# Patient Record
Sex: FEMALE | Race: WHITE | NOT HISPANIC OR LATINO | Employment: FULL TIME | URBAN - METROPOLITAN AREA
[De-identification: names, ages, dates, MRNs, and addresses within clinical notes are randomized per-mention and may not be internally consistent; named-entity substitution may affect disease eponyms.]

---

## 2017-01-08 ENCOUNTER — ANESTHESIA EVENT (OUTPATIENT)
Dept: GASTROENTEROLOGY | Facility: HOSPITAL | Age: 32
End: 2017-01-08
Payer: COMMERCIAL

## 2017-01-09 ENCOUNTER — GENERIC CONVERSION - ENCOUNTER (OUTPATIENT)
Dept: OTHER | Facility: OTHER | Age: 32
End: 2017-01-09

## 2017-01-09 ENCOUNTER — HOSPITAL ENCOUNTER (OUTPATIENT)
Facility: HOSPITAL | Age: 32
Setting detail: OUTPATIENT SURGERY
Discharge: HOME | End: 2017-01-09
Attending: INTERNAL MEDICINE | Admitting: INTERNAL MEDICINE
Payer: COMMERCIAL

## 2017-01-09 ENCOUNTER — ANESTHESIA (OUTPATIENT)
Dept: GASTROENTEROLOGY | Facility: HOSPITAL | Age: 32
End: 2017-01-09
Payer: COMMERCIAL

## 2017-01-09 VITALS
BODY MASS INDEX: 22.19 KG/M2 | DIASTOLIC BLOOD PRESSURE: 59 MMHG | HEART RATE: 84 BPM | WEIGHT: 113 LBS | TEMPERATURE: 98 F | SYSTOLIC BLOOD PRESSURE: 102 MMHG | HEIGHT: 60 IN | RESPIRATION RATE: 18 BRPM | OXYGEN SATURATION: 100 %

## 2017-01-09 DIAGNOSIS — K30 FUNCTIONAL DYSPEPSIA: ICD-10-CM

## 2017-01-09 PROCEDURE — 88305 TISSUE EXAM BY PATHOLOGIST: CPT | Performed by: INTERNAL MEDICINE

## 2017-01-09 RX ORDER — NORGESTIMATE AND ETHINYL ESTRADIOL 0.25-0.035
1 KIT ORAL DAILY
COMMUNITY
End: 2018-04-17 | Stop reason: ALTCHOICE

## 2017-01-09 RX ORDER — SODIUM CHLORIDE 9 MG/ML
100 INJECTION, SOLUTION INTRAVENOUS CONTINUOUS
Status: DISCONTINUED | OUTPATIENT
Start: 2017-01-09 | End: 2017-01-09 | Stop reason: HOSPADM

## 2017-01-09 RX ORDER — PROPOFOL 10 MG/ML
INJECTION, EMULSION INTRAVENOUS AS NEEDED
Status: DISCONTINUED | OUTPATIENT
Start: 2017-01-09 | End: 2017-01-09 | Stop reason: SURG

## 2017-01-09 RX ADMIN — PROPOFOL 100 MG: 10 INJECTION, EMULSION INTRAVENOUS at 13:09

## 2017-01-09 RX ADMIN — PROPOFOL 20 MG: 10 INJECTION, EMULSION INTRAVENOUS at 13:13

## 2017-01-09 RX ADMIN — PROPOFOL 20 MG: 10 INJECTION, EMULSION INTRAVENOUS at 13:12

## 2017-01-09 RX ADMIN — SODIUM CHLORIDE 100 ML/HR: 0.9 INJECTION, SOLUTION INTRAVENOUS at 13:05

## 2017-01-09 RX ADMIN — PROPOFOL 20 MG: 10 INJECTION, EMULSION INTRAVENOUS at 13:11

## 2017-01-12 ENCOUNTER — GENERIC CONVERSION - ENCOUNTER (OUTPATIENT)
Dept: OTHER | Facility: OTHER | Age: 32
End: 2017-01-12

## 2017-06-05 ENCOUNTER — ALLSCRIPTS OFFICE VISIT (OUTPATIENT)
Dept: OTHER | Facility: OTHER | Age: 32
End: 2017-06-05

## 2017-08-17 ENCOUNTER — GENERIC CONVERSION - ENCOUNTER (OUTPATIENT)
Dept: OTHER | Facility: OTHER | Age: 32
End: 2017-08-17

## 2017-08-17 DIAGNOSIS — Z12.4 ENCOUNTER FOR SCREENING FOR MALIGNANT NEOPLASM OF CERVIX: ICD-10-CM

## 2017-08-17 DIAGNOSIS — Z00.00 ENCOUNTER FOR GENERAL ADULT MEDICAL EXAMINATION WITHOUT ABNORMAL FINDINGS: ICD-10-CM

## 2017-08-17 PROCEDURE — 88175 CYTOPATH C/V AUTO FLUID REDO: CPT | Performed by: FAMILY MEDICINE

## 2017-08-17 PROCEDURE — 87624 HPV HI-RISK TYP POOLED RSLT: CPT | Performed by: FAMILY MEDICINE

## 2017-08-18 ENCOUNTER — LAB REQUISITION (OUTPATIENT)
Dept: LAB | Facility: HOSPITAL | Age: 32
End: 2017-08-18
Payer: COMMERCIAL

## 2017-08-18 DIAGNOSIS — Z12.4 ENCOUNTER FOR SCREENING FOR MALIGNANT NEOPLASM OF CERVIX: ICD-10-CM

## 2017-08-18 DIAGNOSIS — Z00.00 ENCOUNTER FOR GENERAL ADULT MEDICAL EXAMINATION WITHOUT ABNORMAL FINDINGS: ICD-10-CM

## 2017-08-22 LAB — HPV RRNA GENITAL QL NAA+PROBE: NORMAL

## 2017-08-23 LAB
LAB AP GYN PRIMARY INTERPRETATION: NORMAL
Lab: NORMAL

## 2017-09-08 ENCOUNTER — ALLSCRIPTS OFFICE VISIT (OUTPATIENT)
Dept: OTHER | Facility: OTHER | Age: 32
End: 2017-09-08

## 2017-09-08 LAB — HCG, QUALITATIVE (HISTORICAL): NEGATIVE

## 2017-11-02 ENCOUNTER — OFFICE VISIT (OUTPATIENT)
Dept: URGENT CARE | Age: 32
End: 2017-11-02
Payer: COMMERCIAL

## 2017-11-02 PROCEDURE — 99213 OFFICE O/P EST LOW 20 MIN: CPT | Performed by: FAMILY MEDICINE

## 2017-11-04 NOTE — PROGRESS NOTES
Assessment  1  Acute bacterial conjunctivitis of right eye (372 03) (H10 31)    Plan  Acute bacterial conjunctivitis of right eye    · Tobramycin 0 3 % Ophthalmic Solution; INSTILL 2 DROP Every 4 hours IN  AFFECTED EYE   · Resume activity to your tolerance ; Status:Complete;   Done: 60DAH3429   · Seek Immediate Medical Attention if: The redness in the white area of the eye is not  better in 2 days ; Status:Complete;   Done: 01ITG4030   · Seek Immediate Medical Attention if: The redness of the eye is getting worse ;  Status:Complete;   Done: 00DQA4146    Discussion/Summary  Discussion Summary:   Avoid rubbing the eye  Medication Side Effects Reviewed: Possible side effects of new medications were reviewed with the patient/guardian today  Understands and agrees with treatment plan: The treatment plan was reviewed with the patient/guardian  The patient/guardian understands and agrees with the treatment plan   Counseling Documentation With Imm: The patient was counseled regarding instructions for management,-- prognosis,-- patient and family education,-- impressions,-- risks and benefits of treatment options,-- importance of compliance with treatment  Follow Up Instructions: Follow Up with your Primary Care Provider in 3-4 days  If your symptoms worsen, go to the nearest [place] [address] Emergency Department  Chief Complaint  1  Eye Itching  Chief Complaint Free Text Note Form: Noted red sclera R eye since Sunday  Worse today with sl  swelling and tearing  Had some clear crustiness on awakening  Denies URI s/s other than red R throat - denies sore throat  History of Present Illness  HPI: Patient with complaints of redness in her right eye since Sunday  She did have some improvement of the symptoms but they have gotten worse again  She does get some crusting and drainage in the morning  She does have mildly sore throat on the right but denies any fevers chills dizziness by sensitivity nausea vomiting  Hospital Based Practices Required Assessment:   Pain Assessment   the patient states they have pain  The pain is located in the R eye  (on a scale of 0 to 10, the patient rates the pain at 5 )   Abuse And Domestic Violence Screen    Yes, the patient is safe at home  -- The patient states no one is hurting them  Depression And Suicide Screen  No, the patient has not had thoughts of hurting themself  No, the patient has not felt depressed in the past 7 days  Prefered Language is  [place]  Primary Language is  English  Eye Itching: Luberta Kanner presents with complaints of no eye itching  Associated symptoms include redness,-- discharge-- and-- lid crusting, but-- no dryness,-- no burning,-- no pain,-- no foreign body sensation,-- no contact lens intolerance,-- no visual blurring,-- no photophobia,-- no lacrimation,-- no lid irritation,-- no lid swelling,-- no lid lumps,-- no nasal irritation,-- no nasal congestion,-- no nasolabial scaling,-- no nasolabial erythema-- and-- no dermatomal rash  Review of Systems  Focused-Female:   Constitutional: as noted in HPI    ENT: as noted in HPI  Integumentary: as noted in HPI  Active Problems  1  Cervical cancer screening (V76 2) (Z12 4)   2  Contraceptive use (V25 40) (Z30 40)   3  Iron deficiency anemia (280 9) (D50 9)   4  Migraine headache (346 90) (G43 909)   5  Need for influenza vaccination (V04 81) (Z23)   6  Nexplanon insertion (V25 5) (Z30 017)   7  Right arm pain (729 5) (M79 601)   8  Seasonal allergies (477 9) (J30 2)    Past Medical History  1  History of Abdominal discomfort, epigastric (789 06) (R10 13)   2  History of Acute bacterial conjunctivitis of both eyes (372 03) (H10 33)   3  History of Encounter for postoperative wound check (V58 49) (Z48 89)   4  History of Encounter for postpartum visit (V24 2) (Z39 2)   5  History of Functional dyspepsia (536 8) (K30)   6  History of nausea (V12 79) (Z87 898)   7   History of pregnancy (V13 29)   8  History of Intrauterine growth restriction affecting antepartum care of mother in third   trimester (656 53) (O36 5959)   5  History of Previous  delivery affecting pregnancy, antepartum (654 23)   (O34 219)   10  History of Rh negative, antepartum (V23 89) (O09 899)   11  History of Viral conjunctivitis, both eyes (077 99) (B30 9)  Active Problems And Past Medical History Reviewed: The active problems and past medical history were reviewed and updated today  Family History  Mother    1  No pertinent family history  Father    2  No pertinent family history  Family History Reviewed: The family history was reviewed and updated today  Social History   · Denied: History of Drug use   · Lives independently with spouse   · Never A Smoker   · Never Drank Alcohol   · Uses Safety Equipment - Seatbelts  Social History Reviewed: The social history was reviewed and updated today  Surgical History  1  History of  Section   2  History of Oral Surgery Tooth Extraction [place] Tooth   3  History of Pilonidal Cyst Resection   4  History of Wrist Excision Of Ganglion  Surgical History Reviewed: The surgical history was reviewed and updated today  Current Meds   1  Claritin 10 MG Oral Capsule; Therapy: (Recorded:2017) to Recorded   2  Multiple Vitamin TABS; Therapy: (Recorded:2017) to Recorded   3  Nexplanon 68 MG Subcutaneous Implant; Therapy: ([id number]) to Recorded  Medication List Reviewed: The medication list was reviewed and updated today  Allergies  1  No Known Drug Allergies  2   Seasonal    Vitals  Signs   Recorded: [id number] 09:12PM   Temperature: 98 7 F, Oral  Heart Rate: 86  Pulse Quality: Regular  Respiration: 18  Systolic: 350, RUE, Sitting  Diastolic: 60, RUE, Sitting  Height: 5 ft   Weight: 112 lb 12 8 oz  BMI Calculated: 22 03  BSA Calculated: 1 46  O2 Saturation: 97  LMP: n/a  Pain Scale: 5    Physical Exam    Constitutional General appearance: No acute distress, well appearing and well nourished  Eyes Right eye conjunctivitis with no foreign bodies and no abrasions noted  No excess tearing  Pupils and irises: Equal, round and reactive to light  Ears, Nose, Mouth, and Throat   External inspection of ears and nose: Normal     Otoscopic examination: Tympanic membranes translucent with normal light reflex  Canals patent without erythema  Nasal mucosa, septum, and turbinates: Normal without edema or erythema  -- Mild erythema of the right tonsil with no soft tissue swelling no exudate  Lymphatic   Palpation of lymph nodes in neck: No lymphadenopathy         Signatures   Electronically signed by : JAMEL Parsons; Nov 2 2017  9:41PM EST                       (Author)    Electronically signed by : Gavin Izquierdo DO; Nov  3 2017  7:27AM EST                       (Co-author)

## 2018-01-09 NOTE — RESULT NOTES
Verified Results  * US ABDOMEN COMPLETE 17Jun2016 03:26PM Comfort Rank Order Number: [id number]   Performing Comments: Please pay particular attention to RUQ as well  thanks   - Patient Instructions: To schedule this appointment, please contact Central Scheduling at [phone]  Test Name Result Flag Reference   US ABDOMEN COMPLETE (Report)     ABDOMEN ULTRASOUND, COMPLETE     INDICATION: Abdominal pain  COMPARISON: None  TECHNIQUE:  Real-time ultrasound of the abdomen was performed with a curvilinear transducer with both volumetric sweeps and still imaging techniques  FINDINGS:     PANCREAS: Visualized portions of the pancreas are within normal limits  AORTA AND IVC: Visualized portions are normal for patient age  LIVER:   Size: Within normal range  The liver measures 13 8 cm in the midclavicular line  Contour: Surface contour is smooth  Parenchyma: Echogenicity and echotexture are within normal limits  No evidence of suspicious mass  The main portal vein is patent and hepatopetal       BILIARY:   The gallbladder is normal in caliber  No wall thickening or pericholecystic fluid  No stones or sludge identified  Sonographic Dwana Nose sign is negative  No intrahepatic biliary dilatation  CBD measures 3 mm  No choledocholithiasis  KIDNEY:    Right kidney measures 10 3 cm  Within normal limits  Left kidney measures 9 7 cm  Within normal limits  SPLEEN:    Measures 9 0 x 2 6 x 7 8 cm  Within normal limits  ASCITES: None  IMPRESSION:     Normal abdominal ultrasound         Workstation performed: [id number]     Signed by:   Cecilia Elliott MD   6/20/16       Plan  Abdominal discomfort, epigastric    · Metoclopramide HCl - 10 MG Oral Tablet; take 1 tablet PO QAC and 1 tablet PO  QHS

## 2018-01-10 NOTE — RESULT NOTES
Verified Results  (1) TISSUE EXAM [id number] 01:12PM Sheldon Hinson     Test Name Result Flag Reference   LAB AP CASE REPORT (Report)     Surgical Pathology Report             Case: [id number]                   Authorizing Provider: Adam Mcclure DO    Collected:      01/09/2017 1312        Ordering Location:   [address] [place] [address]   Received:      01/09/2017 4007 [facility]                               Pathologist:      Rigoberto Roberts MD                              Specimen:  Duodenum, Duodenum biopsy normal- r/o celiac   LAB AP FINAL DIAGNOSIS (Report)     A  Small bowel, duodenum, biopsy:        - Scant fragments of small bowel mucosa with no significant   pathologic alteration         - No villous blunting, intraepithelial lymphocytosis or crypt   hyperplasia is identified suggest malabsorptive enteropathy         - No dysplasia or malignancy is identified  Interpretation performed at , [address]   Electronically signed by Rigoberto Roberts MD on 1/10/2017 at 12:33 PM   LAB AP SURGICAL ADDITIONAL INFORMATION (Report)     These tests were developed and their performance characteristics   determined by [facility] or [facility]  They may not be cleared or approved by the [place]  Food and   Drug Administration  The FDA has determined that such clearance or   approval is not necessary  These tests are used for clinical purposes  They should not be regarded as investigational or for research  This   laboratory has been approved by CLIA 88, designated as a high-complexity   laboratory and is qualified to perform these tests  LAB AP GROSS DESCRIPTION (Report)     A  The specimen is received in formalin, labeled with the patient's name   and hospital number, and is designated duodenum biopsy rule out celiac     The specimen consists of multiple tan soft tissue fragments measuring in   aggregate 0 6 x 0 4 x 0 1 cm  Entirely submitted  One cassette  Note: The estimated total formalin fixation time based upon information   provided by the submitting clinician and the standard processing schedule   is 15 25 hours      [facility]   LAB AP CLINICAL INFORMATION      duodenum biopsy normal- r/o celiac   duodenum biopsy normal- r/o celiac

## 2018-01-11 NOTE — RESULT NOTES
Verified Results  (1) TISSUE TRANSGLUTAMINASE IGA [id number] 09:13AM Jennifer Tijerina Order Number: [id number]     Test Name Result Flag Reference   tTG IGA <2 U/mL  0 - 3   Negative        0 -  3                                Weak Positive   4 - 10                                Positive           >10   Tissue Transglutaminase (tTG) has been identified   as the endomysial antigen  Studies have demonstr-   ated that endomysial IgA antibodies have over 99%   specificity for gluten sensitive enteropathy      Performed at:  [address] [place] [address]  [id number]  : Yamile Pollack MD, Phone:  [phone]

## 2018-01-13 VITALS
BODY MASS INDEX: 22.48 KG/M2 | RESPIRATION RATE: 16 BRPM | HEIGHT: 60 IN | DIASTOLIC BLOOD PRESSURE: 62 MMHG | SYSTOLIC BLOOD PRESSURE: 102 MMHG | HEART RATE: 88 BPM | TEMPERATURE: 99.2 F | WEIGHT: 114.5 LBS

## 2018-01-14 VITALS
SYSTOLIC BLOOD PRESSURE: 120 MMHG | DIASTOLIC BLOOD PRESSURE: 58 MMHG | HEART RATE: 78 BPM | BODY MASS INDEX: 21.43 KG/M2 | WEIGHT: 113.5 LBS | RESPIRATION RATE: 16 BRPM | HEIGHT: 61 IN | TEMPERATURE: 98.1 F

## 2018-01-16 ENCOUNTER — GENERIC CONVERSION - ENCOUNTER (OUTPATIENT)
Dept: OTHER | Facility: OTHER | Age: 33
End: 2018-01-16

## 2018-01-22 VITALS
WEIGHT: 113.13 LBS | HEART RATE: 80 BPM | TEMPERATURE: 98.9 F | SYSTOLIC BLOOD PRESSURE: 90 MMHG | BODY MASS INDEX: 21.36 KG/M2 | DIASTOLIC BLOOD PRESSURE: 60 MMHG | HEIGHT: 61 IN | RESPIRATION RATE: 16 BRPM

## 2018-01-24 VITALS
HEIGHT: 60 IN | DIASTOLIC BLOOD PRESSURE: 64 MMHG | SYSTOLIC BLOOD PRESSURE: 102 MMHG | BODY MASS INDEX: 22.78 KG/M2 | HEART RATE: 81 BPM | WEIGHT: 116 LBS

## 2018-04-10 PROBLEM — Z30.2 ENCOUNTER FOR STERILIZATION: Status: ACTIVE | Noted: 2018-04-10

## 2018-04-17 ENCOUNTER — OFFICE VISIT (OUTPATIENT)
Dept: OBGYN CLINIC | Facility: CLINIC | Age: 33
End: 2018-04-17
Payer: COMMERCIAL

## 2018-04-17 VITALS — BODY MASS INDEX: 23.24 KG/M2 | DIASTOLIC BLOOD PRESSURE: 58 MMHG | SYSTOLIC BLOOD PRESSURE: 108 MMHG | WEIGHT: 117 LBS

## 2018-04-17 DIAGNOSIS — Z01.818 PREOP EXAMINATION: ICD-10-CM

## 2018-04-17 DIAGNOSIS — Z30.2 ENCOUNTER FOR STERILIZATION: Primary | ICD-10-CM

## 2018-04-17 PROBLEM — M79.601 RIGHT ARM PAIN: Status: ACTIVE | Noted: 2017-06-05

## 2018-04-17 PROBLEM — J30.2 SEASONAL ALLERGIES: Status: ACTIVE | Noted: 2017-11-02

## 2018-04-17 PROCEDURE — 99214 OFFICE O/P EST MOD 30 MIN: CPT | Performed by: OBSTETRICS & GYNECOLOGY

## 2018-04-17 NOTE — H&P
Assessment /Plan:     35 y o  [id number] who is requesting permanent sterilization by laparoscopic bilateral salpingectomy  The permanency of the procedure was reviewed with patient  Patient is not currently having periods (Nexplanon)  The risks (infection, bleeding, transfusion, damage to adjacent organs requiring immediate and/or delayed repair, clots inside blood vessels, need to complete procedure using alternative approach, procedural failure, worsening of pre-exisiting medical condition, and death) and alternatives  have been discussed and patient desires to proceed the [address] [place] [address] 18  Consent was reviewed in detail and signed today  Preoperative testing was discussed   Postoperative course discussed and post op medications were reviewed       HPI:  Pt is a 35 y o  [id number] with 3 prior  sections, who has completed child bearing and is requesting sterilization  Patient is not currently having periods (Nexplanon)  PMHx:   Past Medical History:   Diagnosis Date    GERD (gastroesophageal reflux disease)     Migraine        PSHx:   Past Surgical History:   Procedure Laterality Date     SECTION      x3    CYST REMOVAL      Pilonidal    CYST REMOVAL      ganglion    SD EGD TRANSORAL BIOPSY SINGLE/MULTIPLE N/A 2017    Procedure: ESOPHAGOGASTRODUODENOSCOPY (EGD); Surgeon: Ngoc Jackson DO;  Location: [facility]; Service: Gastroenterology    WISDOM TOOTH EXTRACTION         Meds:   (Not in a hospital admission)    Allergies:    Allergies   Allergen Reactions    Pollen Extract        Social Hx:    Social History     Social History    Marital status: /Civil Union     Spouse name: N/A    Number of children: N/A    Years of education: N/A     Social History Main Topics    Smoking status: Never Smoker    Smokeless tobacco: Never Used    Alcohol use Yes      Comment: rarely    Drug use: No    Sexual activity: Yes     Partners: Male     Birth control/ protection: Implant     Other Topics Concern    None     Social History Narrative    None       Ob Hx:   OB History    Para Term  AB Living   3 3 3     3   SAB TAB Ectopic Multiple Live Births           3      # Outcome Date GA Lbr Kamron/2nd Weight Sex Delivery Anes PTL Lv   3 Term      CS-LTranv   DAVID   2 Term      CS-LTranv   DAVID   1 Term      CS-LTranv   DAVID          Gyn HX:  dysmenorrhea, HMB    Fm Hx: History reviewed  No pertinent family history  ROS:  Review of Systems   Constitutional: Negative  Respiratory: Negative  Cardiovascular: Negative  Gastrointestinal: Negative  Genitourinary: Negative  Neurological: Negative  Psychiatric/Behavioral: Negative  VS:  /58   Wt 53 1 kg (117 lb)   LMP  (Approximate) Comment: n/a with nexplanon  Breastfeeding?  No   BMI 23 24 kg/m²       Exam:    Physical Exam    abd        soft, appropriately tender and well healed  scar           vulva      normal external genitalia for age    vagina    color pink, no discharge    cervix     parous    uterus     normal size, non tender    adnexa   no masses or tenderness     Heart - regular rate and rhythm    Lungs - clear to ausculation bilaterally

## 2018-04-19 RX ORDER — DIPHENOXYLATE HYDROCHLORIDE AND ATROPINE SULFATE 2.5; .025 MG/1; MG/1
1 TABLET ORAL DAILY
COMMUNITY

## 2018-04-19 NOTE — PRE-PROCEDURE INSTRUCTIONS
Pre-Surgery Instructions:   Medication Instructions    Etonogestrel (Latrice Spina) 76 MG IMPL Instructed patient per Anesthesia Guidelines   multivitamin [facility]) TABS Patient was instructed by Physician and understands  Pre op and bathing instructions reviewed   Pt has hibiclens

## 2018-04-20 ENCOUNTER — ANESTHESIA EVENT (OUTPATIENT)
Dept: PERIOP | Facility: AMBULARY SURGERY CENTER | Age: 33
End: 2018-04-20
Payer: COMMERCIAL

## 2018-05-01 ENCOUNTER — ANESTHESIA (OUTPATIENT)
Dept: PERIOP | Facility: AMBULARY SURGERY CENTER | Age: 33
End: 2018-05-01
Payer: COMMERCIAL

## 2018-05-01 ENCOUNTER — HOSPITAL ENCOUNTER (OUTPATIENT)
Facility: AMBULARY SURGERY CENTER | Age: 33
Setting detail: OUTPATIENT SURGERY
Discharge: HOME | End: 2018-05-01
Attending: OBSTETRICS & GYNECOLOGY | Admitting: OBSTETRICS & GYNECOLOGY
Payer: COMMERCIAL

## 2018-05-01 VITALS
HEART RATE: 74 BPM | OXYGEN SATURATION: 96 % | WEIGHT: 115 LBS | DIASTOLIC BLOOD PRESSURE: 44 MMHG | TEMPERATURE: 98.1 F | HEIGHT: 60 IN | SYSTOLIC BLOOD PRESSURE: 82 MMHG | RESPIRATION RATE: 18 BRPM | BODY MASS INDEX: 22.58 KG/M2

## 2018-05-01 DIAGNOSIS — Z30.2 ENCOUNTER FOR STERILIZATION: Primary | ICD-10-CM

## 2018-05-01 LAB — EXT PREGNANCY TEST URINE: NEGATIVE

## 2018-05-01 PROCEDURE — 81025 URINE PREGNANCY TEST: CPT | Performed by: OBSTETRICS & GYNECOLOGY

## 2018-05-01 PROCEDURE — 88302 TISSUE EXAM BY PATHOLOGIST: CPT | Performed by: PATHOLOGY

## 2018-05-01 PROCEDURE — 58661 LAPAROSCOPY REMOVE ADNEXA: CPT | Performed by: OBSTETRICS & GYNECOLOGY

## 2018-05-01 RX ORDER — SODIUM CHLORIDE 9 MG/ML
75 INJECTION, SOLUTION INTRAVENOUS CONTINUOUS
Status: DISCONTINUED | OUTPATIENT
Start: 2018-05-01 | End: 2018-05-01 | Stop reason: HOSPADM

## 2018-05-01 RX ORDER — PROPOFOL 10 MG/ML
INJECTION, EMULSION INTRAVENOUS AS NEEDED
Status: DISCONTINUED | OUTPATIENT
Start: 2018-05-01 | End: 2018-05-01

## 2018-05-01 RX ORDER — BUPIVACAINE HYDROCHLORIDE AND EPINEPHRINE 2.5; 5 MG/ML; UG/ML
INJECTION, SOLUTION EPIDURAL; INFILTRATION; INTRACAUDAL; PERINEURAL AS NEEDED
Status: DISCONTINUED | OUTPATIENT
Start: 2018-05-01 | End: 2018-05-01 | Stop reason: HOSPADM

## 2018-05-01 RX ORDER — FENTANYL CITRATE/PF 50 MCG/ML
25 SYRINGE (ML) INJECTION
Status: COMPLETED | OUTPATIENT
Start: 2018-05-01 | End: 2018-05-01

## 2018-05-01 RX ORDER — ONDANSETRON 2 MG/ML
INJECTION INTRAMUSCULAR; INTRAVENOUS AS NEEDED
Status: DISCONTINUED | OUTPATIENT
Start: 2018-05-01 | End: 2018-05-01 | Stop reason: SURG

## 2018-05-01 RX ORDER — SENNOSIDES 8.6 MG
650 CAPSULE ORAL EVERY 8 HOURS PRN
Qty: 30 TABLET | Refills: 0 | Status: SHIPPED | OUTPATIENT
Start: 2018-05-01 | End: 2020-10-08

## 2018-05-01 RX ORDER — KETOROLAC TROMETHAMINE 30 MG/ML
INJECTION, SOLUTION INTRAMUSCULAR; INTRAVENOUS AS NEEDED
Status: DISCONTINUED | OUTPATIENT
Start: 2018-05-01 | End: 2018-05-01 | Stop reason: SURG

## 2018-05-01 RX ORDER — MIDAZOLAM HYDROCHLORIDE 1 MG/ML
INJECTION INTRAMUSCULAR; INTRAVENOUS AS NEEDED
Status: DISCONTINUED | OUTPATIENT
Start: 2018-05-01 | End: 2018-05-01 | Stop reason: SURG

## 2018-05-01 RX ORDER — ONDANSETRON 2 MG/ML
4 INJECTION INTRAMUSCULAR; INTRAVENOUS ONCE AS NEEDED
Status: DISCONTINUED | OUTPATIENT
Start: 2018-05-01 | End: 2018-05-01 | Stop reason: HOSPADM

## 2018-05-01 RX ORDER — FENTANYL CITRATE 50 UG/ML
INJECTION, SOLUTION INTRAMUSCULAR; INTRAVENOUS AS NEEDED
Status: DISCONTINUED | OUTPATIENT
Start: 2018-05-01 | End: 2018-05-01 | Stop reason: SURG

## 2018-05-01 RX ORDER — SODIUM CHLORIDE, SODIUM LACTATE, POTASSIUM CHLORIDE, CALCIUM CHLORIDE 600; 310; 30; 20 MG/100ML; MG/100ML; MG/100ML; MG/100ML
125 INJECTION, SOLUTION INTRAVENOUS CONTINUOUS
Status: DISCONTINUED | OUTPATIENT
Start: 2018-05-01 | End: 2018-05-01 | Stop reason: HOSPADM

## 2018-05-01 RX ORDER — IBUPROFEN 600 MG/1
600 TABLET ORAL EVERY 6 HOURS PRN
Qty: 30 TABLET | Refills: 0 | Status: SHIPPED | OUTPATIENT
Start: 2018-05-01 | End: 2020-10-08

## 2018-05-01 RX ORDER — OXYCODONE HYDROCHLORIDE AND ACETAMINOPHEN 5; 325 MG/1; MG/1
1 TABLET ORAL EVERY 4 HOURS PRN
Status: DISCONTINUED | OUTPATIENT
Start: 2018-05-01 | End: 2018-05-01 | Stop reason: HOSPADM

## 2018-05-01 RX ORDER — GLYCOPYRROLATE 0.2 MG/ML
INJECTION INTRAMUSCULAR; INTRAVENOUS AS NEEDED
Status: DISCONTINUED | OUTPATIENT
Start: 2018-05-01 | End: 2018-05-01 | Stop reason: SURG

## 2018-05-01 RX ORDER — ACETAMINOPHEN 325 MG/1
650 TABLET ORAL EVERY 6 HOURS PRN
Status: DISCONTINUED | OUTPATIENT
Start: 2018-05-01 | End: 2018-05-01 | Stop reason: HOSPADM

## 2018-05-01 RX ORDER — IBUPROFEN 600 MG/1
600 TABLET ORAL EVERY 6 HOURS PRN
Status: DISCONTINUED | OUTPATIENT
Start: 2018-05-01 | End: 2018-05-01 | Stop reason: HOSPADM

## 2018-05-01 RX ORDER — METOCLOPRAMIDE HYDROCHLORIDE 5 MG/ML
INJECTION INTRAMUSCULAR; INTRAVENOUS AS NEEDED
Status: DISCONTINUED | OUTPATIENT
Start: 2018-05-01 | End: 2018-05-01 | Stop reason: SURG

## 2018-05-01 RX ORDER — LIDOCAINE HYDROCHLORIDE 10 MG/ML
INJECTION, SOLUTION INFILTRATION; PERINEURAL AS NEEDED
Status: DISCONTINUED | OUTPATIENT
Start: 2018-05-01 | End: 2018-05-01 | Stop reason: SURG

## 2018-05-01 RX ORDER — ALBUTEROL SULFATE 2.5 MG/3ML
2.5 SOLUTION RESPIRATORY (INHALATION) ONCE AS NEEDED
Status: DISCONTINUED | OUTPATIENT
Start: 2018-05-01 | End: 2018-05-01 | Stop reason: HOSPADM

## 2018-05-01 RX ORDER — SODIUM CHLORIDE 9 MG/ML
INJECTION, SOLUTION INTRAVENOUS CONTINUOUS PRN
Status: DISCONTINUED | OUTPATIENT
Start: 2018-05-01 | End: 2018-05-01 | Stop reason: SURG

## 2018-05-01 RX ORDER — ROCURONIUM BROMIDE 10 MG/ML
INJECTION, SOLUTION INTRAVENOUS AS NEEDED
Status: DISCONTINUED | OUTPATIENT
Start: 2018-05-01 | End: 2018-05-01 | Stop reason: SURG

## 2018-05-01 RX ORDER — PROPOFOL 10 MG/ML
INJECTION, EMULSION INTRAVENOUS AS NEEDED
Status: DISCONTINUED | OUTPATIENT
Start: 2018-05-01 | End: 2018-05-01 | Stop reason: SURG

## 2018-05-01 RX ADMIN — KETOROLAC TROMETHAMINE 30 MG: 30 INJECTION, SOLUTION INTRAMUSCULAR at 09:20

## 2018-05-01 RX ADMIN — FENTANYL CITRATE 25 MCG: 50 INJECTION, SOLUTION INTRAMUSCULAR; INTRAVENOUS at 10:31

## 2018-05-01 RX ADMIN — ROCURONIUM BROMIDE 40 MG: 10 INJECTION INTRAVENOUS at 08:29

## 2018-05-01 RX ADMIN — ONDANSETRON 4 MG: 2 INJECTION INTRAMUSCULAR; INTRAVENOUS at 08:40

## 2018-05-01 RX ADMIN — FENTANYL CITRATE 25 MCG: 50 INJECTION, SOLUTION INTRAMUSCULAR; INTRAVENOUS at 10:08

## 2018-05-01 RX ADMIN — METOCLOPRAMIDE HYDROCHLORIDE 10 MG: 5 INJECTION INTRAMUSCULAR; INTRAVENOUS at 08:40

## 2018-05-01 RX ADMIN — FENTANYL CITRATE 25 MCG: 50 INJECTION, SOLUTION INTRAMUSCULAR; INTRAVENOUS at 10:02

## 2018-05-01 RX ADMIN — NEOSTIGMINE METHYLSULFATE 3 MG: 1 INJECTION, SOLUTION INTRAMUSCULAR; INTRAVENOUS; SUBCUTANEOUS at 09:32

## 2018-05-01 RX ADMIN — DEXAMETHASONE SODIUM PHOSPHATE 8 MG: 10 INJECTION INTRAMUSCULAR; INTRAVENOUS at 08:40

## 2018-05-01 RX ADMIN — MIDAZOLAM HYDROCHLORIDE 2 MG: 1 INJECTION, SOLUTION INTRAMUSCULAR; INTRAVENOUS at 08:26

## 2018-05-01 RX ADMIN — FENTANYL CITRATE 100 MCG: 50 INJECTION, SOLUTION INTRAMUSCULAR; INTRAVENOUS at 08:33

## 2018-05-01 RX ADMIN — GLYCOPYRROLATE 0.4 MG: 0.2 INJECTION, SOLUTION INTRAMUSCULAR; INTRAVENOUS at 09:32

## 2018-05-01 RX ADMIN — FENTANYL CITRATE 25 MCG: 50 INJECTION, SOLUTION INTRAMUSCULAR; INTRAVENOUS at 10:17

## 2018-05-01 RX ADMIN — PROPOFOL 200 MG: 10 INJECTION, EMULSION INTRAVENOUS at 08:29

## 2018-05-01 RX ADMIN — LIDOCAINE HYDROCHLORIDE 50 MG: 10 INJECTION, SOLUTION INFILTRATION; PERINEURAL at 08:29

## 2018-05-01 RX ADMIN — SODIUM CHLORIDE: 0.9 INJECTION, SOLUTION INTRAVENOUS at 08:05

## 2018-05-01 NOTE — OP NOTE
OPERATIVE REPORT  PATIENT NAME: Shaquille Musa    :  1985  MRN: [id number]  Pt Location: [facility] OR ROOM 06    SURGERY DATE: 2018    Surgeon(s) and Role:     * Jocelyn Antunez MD - Primary     * N[address] , DO - Assisting    Preop Diagnosis:  Encounter for sterilization [Z30 2]    Post-Op Diagnosis Codes:     * Encounter for sterilization [Z30 2]    Procedure(s) (LRB):  LAPAROSCOPIC SALPINGECTOMY (Bilateral)    Specimen(s):  ID Type Source Tests Collected by Time Destination   1 : Bilateral fallopian tubes Tissue Fallopian Tubes, Bilateral TISSUE EXAM Jocelyn Antunez MD 2018 6723        Estimated Blood Loss:   25 mL    Drains:   None    Anesthesia Type:   Choice    Operative Indications:  Encounter for sterilization [Z30 2]      Operative Findings:    Normal appearing external genitalia  Normal appearing cervix and vagina  Normal appearing uterus with minimal adhesive disease at the bladder  Normal appearing bilateral ovaries  Normal appearing right fallopian tube  Left fallopian tube redundant mesosalpinx  Normal appearing liver edge    Complications:   None    Procedure and Technique:  The patient was taken to the operating room and placed in the operating room table in the supine position  General anesthesia was established without difficulty  Venodyne's were applied and turned on  Patient was placed in the dorsal lithotomy position taking care to avoid overextension at the hip and that all pressure points were padded  She was prepped and draped in the usual sterile fashion  A timeout was performed to confirm correct patient and correct procedure  No preoperative antibiotic prophylaxis was indicated  An exam under anesthesia was performed and the above findings noted  A mcknight catheter was aspaectically inserted  A Lopez retractor was inserted into the vagina, and using a Inez retractor, the anterior lip of the cervix was visualized and grasped with a single-tooth tenaculum   A small Anthony dilator was gently inserted into the uterus and taped to the tenaculum  This was used for uterine manipulation  Attention was then turned to the abdomen; gloves were changed  The infraumbilical region was infiltrated with 0 25% Marcaine plain  A scalpel was used to make a 5 mm  incision infraumbilically  The laparoscope was inserted under direct visualization  The abdomen was insufflated  Two additional 5 mm trochars were inserted using the same technique in the right and left lower quadrant  The fimbria of the right fallopian tube was grasped and using the Enseal device, the mesosalpinx was coagulated and cut up to the cornual region  It was them transected  The right fallopian tube was removed in the same manner  The tubes were removed through the trocar and collected for pathology  The pelvis was inspected  The enseal device was then used to ensure excellent hemostasis  Flow seal was then applied to the right dissection site  Excellent hemostasis noted  The pneumoperitoneum was evacuated  The trocars were removed  The incisions were closed using 4-0 Monocryl in a subcuticular fashion  Histoacryl was applied to the skin  The tenaculum and dilator were removed from the vagina  There was bleeding at the tenaculum site  2 0 vicryl figure of eight suture was placed to obtain  There was excellent hemostasis  This concluded the procedure  The patient tolerated the procedure well  I was present and participated for the entire procedure  The counts were correct ×2 at the end of the procedure       I was present for the entire procedure    Patient Disposition:  PACU  and extubated and stable    SIGNATURE: Janice Garcia MD  DATE: May 1, 2018  TIME: 10:00 AM

## 2018-05-01 NOTE — DISCHARGE INSTRUCTIONS
Laparoscopic Tubal Ligation   WHAT YOU SHOULD KNOW:   A laparoscopic tubal ligation is surgery to close your fallopian tubes to prevent pregnancy  AFTER YOU LEAVE:   Medicines:   · Acetaminophen  decreases pain and fever  It is available without a doctor's order  Ask your primary healthcare provider [facility]) how much to take and how often to take it  Follow directions  Acetaminophen can cause liver damage if not taken correctly  · NSAIDs  help decrease swelling, pain, and fever  This medicine is available without a doctor's order  NSAIDs can cause stomach bleeding or kidney problems  If you take blood thinner medicine, always ask your PHP if NSAIDs are safe for you  Always read the medicine label and follow directions  · Prescription pain medicine  may be given  Ask your PHP how to take this medicine safely  · Antibiotics  help treat or prevent a bacterial infection  · Take your medicine as directed  Contact your PHP if you think your medicine is not helping or if you have side effects  Tell him if you are allergic to any medicine  Keep a list of the medicines, vitamins, and herbs you take  Include the amounts, and when and why you take them  Bring the list or the pill bottles to follow-up visits  Carry your medicine list with you in case of an emergency  Follow up with your surgeon or gynecologist as directed:  Write down your questions so you remember to ask them during your visits  Activity:  You may feel like resting more after surgery  Slowly start to do more each day  Rest when you feel it is needed  Ask when you can return to your daily activities  Contact your surgeon or gynecologist if:   · You have a fever  · Your incisions come apart  · You have heavy bleeding from your vagina or your incisions  · You have trouble urinating, burning when you urinate, or bloody urine  · Your incision is red, swollen, or has pus or foul-smelling drainage coming from it       · You have pain that gets worse instead of better, or that is not controlled with your medicine  · Your skin is itchy, swollen, or has a rash  · You are vomiting and are not able to keep food or fluids down for over 24 hours  · You have questions or concerns about your condition or care  Seek care immediately or call 911 if:   · You have sudden shortness of breath or chest pain  · You have heavy vaginal bleeding that fills 1 or more sanitary pads each hour for 4 hours in a row  © 2014 [address] is for End User's use only and may not be sold, redistributed or otherwise used for commercial purposes  All illustrations and images included in CareNotes® are the copyrighted property of A D A M , Inc  or [address]  The above information is an  only  It is not intended as medical advice for individual conditions or treatments  Talk to your doctor, nurse or pharmacist before following any medical regimen to see if it is safe and effective for you

## 2018-05-01 NOTE — ANESTHESIA PREPROCEDURE EVALUATION
Review of Systems/Medical History  Patient summary reviewed  Chart reviewed  No history of anesthetic complications     Cardiovascular  Negative cardio ROS    Pulmonary  Negative pulmonary ROS        GI/Hepatic    GERD ,        Negative  ROS        Endo/Other  Negative endo/other ROS      GYN  Negative gynecology ROS          Hematology  Anemia ,     Musculoskeletal  Negative musculoskeletal ROS        Neurology    Headaches,   Comment: Migraines Psychology   Negative psychology ROS              Physical Exam    Airway    Mallampati score: II  TM Distance: >3 FB  Neck ROM: full     Dental   No notable dental hx     Cardiovascular  Comment: Negative ROS, Rhythm: regular, Rate: normal, Cardiovascular exam normal    Pulmonary  Pulmonary exam normal Breath sounds clear to auscultation,     Other Findings        Anesthesia Plan  ASA Score- 1     Anesthesia Type- general with ASA Monitors  Additional Monitors:   Airway Plan: ETT  Plan Factors-    Induction- intravenous  Postoperative Plan- Plan for postoperative opioid use  Informed Consent- Anesthetic plan and risks discussed with patient and spouse  I personally reviewed this patient with the CRNA  Discussed and agreed on the Anesthesia Plan with the CRNA  Lan Tijerina Recent labs personally reviewed:  Lab Results   Component Value Date    WBC 6 66 03/23/2016    HGB 13 7 03/23/2016     03/23/2016     Lab Results   Component Value Date     03/23/2016    K 4 0 03/23/2016    BUN 7 03/23/2016    CREATININE 0 74 03/23/2016    GLUCOSE 87 03/23/2016     No results found for: PTT   No results found for: INR    Blood type O    No results found for: Nicole Tavares, Tracie Aponte MD, have personally seen and evaluated the patient prior to anesthetic care  I have reviewed the pre-anesthetic record, and other medical records if appropriate to the anesthetic care    If a CRNA is involved in the case, I have reviewed the CRNA assessment, if present, and agree  Risks/benefits and alternatives discussed with patient including possible PONV, sore throat, and possibility of rare anesthetic and surgical emergencies

## 2018-05-01 NOTE — ANESTHESIA POSTPROCEDURE EVALUATION
Post-Op Assessment Note      CV Status:  Stable    Mental Status:  Alert and awake    Hydration Status:  Euvolemic    PONV Controlled:  Controlled    Airway Patency:  Patent    Post Op Vitals Reviewed: Yes          Staff: CRNA           BP   114/64   Temp   97 9   Pulse  79   Resp   18   SpO2   98%`

## 2018-05-01 NOTE — H&P (VIEW-ONLY)
Assessment /Plan:     35 y o  [id number] who is requesting permanent sterilization by laparoscopic bilateral salpingectomy  The permanency of the procedure was reviewed with patient  Patient is not currently having periods (Nexplanon)  The risks (infection, bleeding, transfusion, damage to adjacent organs requiring immediate and/or delayed repair, clots inside blood vessels, need to complete procedure using alternative approach, procedural failure, worsening of pre-exisiting medical condition, and death) and alternatives  have been discussed and patient desires to proceed the [address] [place] [address] 18  Consent was reviewed in detail and signed today  Preoperative testing was discussed   Postoperative course discussed and post op medications were reviewed       HPI:  Pt is a 35 y o  [id number] with 3 prior  sections, who has completed child bearing and is requesting sterilization  Patient is not currently having periods (Nexplanon)  PMHx:   Past Medical History:   Diagnosis Date    GERD (gastroesophageal reflux disease)     Migraine        PSHx:   Past Surgical History:   Procedure Laterality Date     SECTION      x3    CYST REMOVAL      Pilonidal    CYST REMOVAL      ganglion    [place] EGD TRANSORAL BIOPSY SINGLE/MULTIPLE N/A 2017    Procedure: ESOPHAGOGASTRODUODENOSCOPY (EGD); Surgeon: Betina Brock DO;  Location: [facility]; Service: Gastroenterology    WISDOM TOOTH EXTRACTION         Meds:   (Not in a hospital admission)    Allergies:    Allergies   Allergen Reactions    Pollen Extract        Social Hx:    Social History     Social History    Marital status: /Civil Union     Spouse name: N/A    Number of children: N/A    Years of education: N/A     Social History Main Topics    Smoking status: Never Smoker    Smokeless tobacco: Never Used    Alcohol use Yes      Comment: rarely    Drug use: No    Sexual activity: Yes     Partners: Male     Birth control/ protection: Implant     Other Topics Concern    None     Social History Narrative    None       Ob Hx:   OB History    Para Term  AB Living   3 3 3     3   SAB TAB Ectopic Multiple Live Births           3      # Outcome Date GA Lbr Kamron/2nd Weight Sex Delivery Anes PTL Lv   3 Term      CS-LTranv   DAVID   2 Term      CS-LTranv   DAVID   1 Term      CS-LTranv   DAVID          Gyn HX:  dysmenorrhea, HMB    Fm Hx: History reviewed  No pertinent family history  ROS:  Review of Systems   Constitutional: Negative  Respiratory: Negative  Cardiovascular: Negative  Gastrointestinal: Negative  Genitourinary: Negative  Neurological: Negative  Psychiatric/Behavioral: Negative  VS:  /58   Wt 53 1 kg (117 lb)   LMP  (Approximate) Comment: n/a with nexplanon  Breastfeeding?  No   BMI 23 24 kg/m²       Exam:    Physical Exam    abd        soft, appropriately tender and well healed  scar           vulva      normal external genitalia for age    vagina    color pink, no discharge    cervix     parous    uterus     normal size, non tender    adnexa   no masses or tenderness     Heart - regular rate and rhythm    Lungs - clear to ausculation bilaterally

## 2018-05-10 ENCOUNTER — OFFICE VISIT (OUTPATIENT)
Dept: OBGYN CLINIC | Facility: CLINIC | Age: 33
End: 2018-05-10

## 2018-05-10 VITALS — SYSTOLIC BLOOD PRESSURE: 108 MMHG | WEIGHT: 115 LBS | DIASTOLIC BLOOD PRESSURE: 64 MMHG | BODY MASS INDEX: 22.46 KG/M2

## 2018-05-10 DIAGNOSIS — Z98.890 POST-OPERATIVE STATE: Primary | ICD-10-CM

## 2018-05-10 PROCEDURE — 99024 POSTOP FOLLOW-UP VISIT: CPT | Performed by: OBSTETRICS & GYNECOLOGY

## 2018-05-11 NOTE — PROGRESS NOTES
Diego Billings is a 35 y o  female who presents to the clinic 2 weeks status post a laprascopic bilateral salpingectomy for the desire of permanent sterilization  Eating a regular diet without difficulty  Bowel movements are normal  The patient is not having any pain  The following portions of the patient's history were reviewed and updated as appropriate: allergies, current medications, past family history, past medical history, past social history, past surgical history and problem list     Review of Systems  Pertinent items are noted in HPI  Objective     /64   Wt 52 2 kg (115 lb)   Breastfeeding? No   BMI 22 46 kg/m²   General:  alert and oriented, in no acute distress   Abdomen: soft, bowel sounds active, non-tender   Incision:   healing well, no drainage, no erythema, no hernia, no seroma, no swelling, no dehiscence, incision well approximated         Assessment      Doing well postoperatively  Operative findings again reviewed  Pathology report discussed  Plan     1  Return to work with normal activities    5  Follow up: 6 months for annual

## 2018-08-07 ENCOUNTER — TELEPHONE (OUTPATIENT)
Dept: FAMILY MEDICINE CLINIC | Facility: CLINIC | Age: 33
End: 2018-08-07

## 2018-10-24 ENCOUNTER — IMMUNIZATION (OUTPATIENT)
Dept: FAMILY MEDICINE CLINIC | Facility: CLINIC | Age: 33
End: 2018-10-24
Payer: COMMERCIAL

## 2018-10-24 DIAGNOSIS — Z23 ENCOUNTER FOR IMMUNIZATION: ICD-10-CM

## 2018-10-24 PROCEDURE — 90471 IMMUNIZATION ADMIN: CPT

## 2018-10-24 PROCEDURE — 90686 IIV4 VACC NO PRSV 0.5 ML IM: CPT

## 2018-12-19 ENCOUNTER — TELEPHONE (OUTPATIENT)
Dept: OTHER | Facility: HOSPITAL | Age: 33
End: 2018-12-19

## 2018-12-19 NOTE — TELEPHONE ENCOUNTER
Called patient to ask if she would like to continue answering the questionnaires for the POET study  Also, to ask if she would be interested to re-consent for the POET study  Patient didn't answer, left a voicemail to please call back

## 2019-01-23 ENCOUNTER — TELEPHONE (OUTPATIENT)
Dept: OTHER | Facility: HOSPITAL | Age: 34
End: 2019-01-23

## 2019-01-23 NOTE — TELEPHONE ENCOUNTER
Called patient to ask if she would like to continue answering the questionnaires for the POET study  Also, called to ask if she would like to re-consent for the POET study due to addition to the HIPAA language in the consent  Patient didn't answer, left a voicemail to please call me back at [phone]  Patient called back, stated that she is would like to continue answering the questionnaires as well as; re-consenting with the new consent  Will call patient next week and set a Appt  to re-consent

## 2019-02-13 ENCOUNTER — TELEPHONE (OUTPATIENT)
Dept: OTHER | Facility: HOSPITAL | Age: 34
End: 2019-02-13

## 2019-02-13 NOTE — TELEPHONE ENCOUNTER
Contacted patient to let her know that the consent for the POET study are taking longer than anticipated  Patient is aware that staff from clinical research will contact her to set a time for her to come in and re-consent  Also, asked patient if she has had any new cosmetic or medical permanent implants, piercings, tattoos (including body tattoos with metallic colorants), or wear eyeglasses in the last year  She stated "NO"

## 2019-03-29 ENCOUNTER — TELEPHONE (OUTPATIENT)
Dept: OTHER | Facility: HOSPITAL | Age: 34
End: 2019-03-29

## 2019-03-29 NOTE — TELEPHONE ENCOUNTER
attempted to contact the patient to schedule her for 12 month Follow-up for the POET Study  Left a voicemail to please call back at [phone]

## 2019-04-11 ENCOUNTER — TELEPHONE (OUTPATIENT)
Dept: OTHER | Facility: HOSPITAL | Age: 34
End: 2019-04-11

## 2019-04-26 ENCOUNTER — DOCUMENTATION (OUTPATIENT)
Dept: OTHER | Facility: HOSPITAL | Age: 34
End: 2019-04-26

## 2019-05-02 ENCOUNTER — TELEPHONE (OUTPATIENT)
Dept: OTHER | Facility: HOSPITAL | Age: 34
End: 2019-05-02

## 2019-10-17 ENCOUNTER — IMMUNIZATIONS (OUTPATIENT)
Dept: FAMILY MEDICINE CLINIC | Facility: CLINIC | Age: 34
End: 2019-10-17

## 2019-10-17 DIAGNOSIS — Z23 ENCOUNTER FOR IMMUNIZATION: ICD-10-CM

## 2019-10-17 PROCEDURE — 90471 IMMUNIZATION ADMIN: CPT

## 2019-10-17 PROCEDURE — 90686 IIV4 VACC NO PRSV 0.5 ML IM: CPT

## 2020-02-13 ENCOUNTER — TELEPHONE (OUTPATIENT)
Dept: FAMILY MEDICINE CLINIC | Facility: CLINIC | Age: 35
End: 2020-02-13

## 2020-02-13 NOTE — TELEPHONE ENCOUNTER
Patient called for nexplanon removal  We have no documentation and patient has not been seen in a very long time  Please schedule patient for annual physical, discussion of nexplanon removal can be done at that time  Thanks!

## 2020-10-09 ENCOUNTER — OFFICE VISIT (OUTPATIENT)
Dept: FAMILY MEDICINE CLINIC | Facility: CLINIC | Age: 35
End: 2020-10-09

## 2020-10-09 VITALS
RESPIRATION RATE: 18 BRPM | BODY MASS INDEX: 23.01 KG/M2 | DIASTOLIC BLOOD PRESSURE: 68 MMHG | TEMPERATURE: 98.2 F | HEART RATE: 84 BPM | WEIGHT: 117.8 LBS | SYSTOLIC BLOOD PRESSURE: 120 MMHG | OXYGEN SATURATION: 99 %

## 2020-10-09 DIAGNOSIS — Z23 ENCOUNTER FOR IMMUNIZATION: Primary | ICD-10-CM

## 2020-10-09 DIAGNOSIS — Z30.09 GENERAL COUNSELING AND ADVICE ON FEMALE CONTRACEPTION: ICD-10-CM

## 2020-10-09 PROCEDURE — 1036F TOBACCO NON-USER: CPT

## 2020-10-09 PROCEDURE — 90471 IMMUNIZATION ADMIN: CPT

## 2020-10-09 PROCEDURE — 99213 OFFICE O/P EST LOW 20 MIN: CPT

## 2020-10-09 PROCEDURE — 90686 IIV4 VACC NO PRSV 0.5 ML IM: CPT

## 2020-10-20 ENCOUNTER — TELEPHONE (OUTPATIENT)
Dept: FAMILY MEDICINE CLINIC | Facility: CLINIC | Age: 35
End: 2020-10-20

## 2020-10-21 ENCOUNTER — TELEPHONE (OUTPATIENT)
Dept: FAMILY MEDICINE CLINIC | Facility: CLINIC | Age: 35
End: 2020-10-21

## 2020-10-22 ENCOUNTER — OFFICE VISIT (OUTPATIENT)
Dept: FAMILY MEDICINE CLINIC | Facility: CLINIC | Age: 35
End: 2020-10-22

## 2020-10-22 VITALS
BODY MASS INDEX: 22.81 KG/M2 | SYSTOLIC BLOOD PRESSURE: 100 MMHG | DIASTOLIC BLOOD PRESSURE: 70 MMHG | HEART RATE: 75 BPM | RESPIRATION RATE: 18 BRPM | TEMPERATURE: 97.8 F | WEIGHT: 116.2 LBS | OXYGEN SATURATION: 98 % | HEIGHT: 60 IN

## 2020-10-22 DIAGNOSIS — Z30.46 NEXPLANON REMOVAL: Primary | ICD-10-CM

## 2020-10-22 PROCEDURE — 3008F BODY MASS INDEX DOCD: CPT

## 2020-10-22 PROCEDURE — 11982 REMOVE DRUG IMPLANT DEVICE: CPT | Performed by: FAMILY MEDICINE

## 2021-04-25 ENCOUNTER — IMMUNIZATIONS (OUTPATIENT)
Dept: FAMILY MEDICINE CLINIC | Facility: HOSPITAL | Age: 36
End: 2021-04-25

## 2021-04-25 DIAGNOSIS — Z23 ENCOUNTER FOR IMMUNIZATION: Primary | ICD-10-CM

## 2021-04-25 PROCEDURE — 0001A SARS-COV-2 / COVID-19 MRNA VACCINE (PFIZER-BIONTECH) 30 MCG: CPT

## 2021-04-25 PROCEDURE — 91300 SARS-COV-2 / COVID-19 MRNA VACCINE (PFIZER-BIONTECH) 30 MCG: CPT

## 2021-04-30 ENCOUNTER — HOSPITAL ENCOUNTER (EMERGENCY)
Facility: HOSPITAL | Age: 36
Discharge: HOME | End: 2021-04-30
Attending: EMERGENCY MEDICINE | Admitting: EMERGENCY MEDICINE
Payer: COMMERCIAL

## 2021-04-30 VITALS
OXYGEN SATURATION: 99 % | DIASTOLIC BLOOD PRESSURE: 63 MMHG | RESPIRATION RATE: 16 BRPM | TEMPERATURE: 97.5 F | SYSTOLIC BLOOD PRESSURE: 119 MMHG | HEART RATE: 99 BPM

## 2021-04-30 DIAGNOSIS — H61.001 PERICHONDRITIS OF AURICLE, RIGHT: Primary | ICD-10-CM

## 2021-04-30 PROCEDURE — 99282 EMERGENCY DEPT VISIT SF MDM: CPT

## 2021-04-30 PROCEDURE — 99284 EMERGENCY DEPT VISIT MOD MDM: CPT | Performed by: PHYSICIAN ASSISTANT

## 2021-04-30 RX ORDER — CIPROFLOXACIN 500 MG/1
500 TABLET, FILM COATED ORAL EVERY 12 HOURS
Qty: 14 TABLET | Refills: 0 | Status: SHIPPED | OUTPATIENT
Start: 2021-04-30 | End: 2021-05-07

## 2021-04-30 NOTE — ED PROVIDER NOTES
History  Chief Complaint   Patient presents with    Earache     C/o r sided earache  Staerted on abx last night  Denies improvement  States "I want to get to checked before the weekend "     LH is a 39year old female with pmh of GERD and migraine who presents to the emergency department with right ear pain  The patient states that she received a piercing on Sunday and has since developed worsening redness, swelling, itchiness, and pain at the site  She removed the piercing on Wednesday  She was seen by telemedicine and prescribed Bactrim as well as polymyxin neomycin drops  She came to the emergency department today for subsequent evaluation  She denies any hearing loss, any ear symptoms, fevers, chills, red streaking, or neck pain  She notes that since the antibiotic prescription her symptoms have gotten mildly better  She denies taking any over-the-counter medications for symptoms  History provided by:  Patient  Earache  Location:  Right (Right superior helix)  Behind ear:  No abnormality  Quality:  Sore  Severity:  Mild  Onset quality:  Gradual  Duration:  3 days  Timing:  Constant  Progression:  Unchanged  Chronicity:  New  Context: foreign body    Relieved by:  Nothing  Worsened by:  Nothing  Ineffective treatments:  None tried  Associated symptoms: no congestion, no cough, no ear discharge, no fever, no hearing loss, no neck pain, no rash, no rhinorrhea and no tinnitus    Risk factors comment:  Recent piercing      Prior to Admission Medications   Prescriptions Last Dose Informant Patient Reported? Taking?    Etonogestrel (NEXPLANON) 76 MG IMPL  Self Yes No   Sig: Inject under the skin   multivitamin (THERAGRAN) TABS  Self Yes No   Sig: Take 1 tablet by mouth daily      Facility-Administered Medications: None       Past Medical History:   Diagnosis Date    GERD (gastroesophageal reflux disease)     diet controlled    Migraine        Past Surgical History:   Procedure Laterality Date     SECTION      x3    CYST REMOVAL      Pilonidal    CYST REMOVAL      ganglion    [place] EGD TRANSORAL BIOPSY SINGLE/MULTIPLE N/A 2017    Procedure: ESOPHAGOGASTRODUODENOSCOPY (EGD); Surgeon: [employer], DO;  [facility]; Service: Gastroenterology    AR LAP,RMV  ADNEXAL STRUCTURE Bilateral 2018    Procedure: LAPAROSCOPIC SALPINGECTOMY;  Surgeon: Gill Clarke MD;  [facility];  Service: Gynecology    WISDOM TOOTH EXTRACTION         Family History   Problem Relation Age of Onset    No Known Problems Mother     No Known Problems Father      I have reviewed and agree with the history as documented  E-Cigarette/Vaping    E-Cigarette Use Never User      E-Cigarette/Vaping Substances    Nicotine No     THC No     CBD No     Flavoring No     Other No     Unknown No      Social History     Tobacco Use    Smoking status: Never Smoker    Smokeless tobacco: Never Used   Substance Use Topics    Alcohol use: Yes     Comment: rarely    Drug use: No       Review of Systems   Constitutional: Negative for activity change, appetite change and fever  HENT: Positive for ear pain  Negative for congestion, ear discharge, hearing loss, rhinorrhea and tinnitus  Respiratory: Negative for cough and shortness of breath  Cardiovascular: Negative for chest pain and leg swelling  Musculoskeletal: Negative for neck pain  Skin: Positive for wound  Negative for color change, pallor and rash  All other systems reviewed and are negative  Physical Exam  Physical Exam  Vitals signs and nursing note reviewed  Constitutional:       General: She is not in acute distress  Appearance: Normal appearance  She is normal weight  She is not ill-appearing or toxic-appearing  HENT:      Head: Normocephalic and atraumatic  Right Ear: Hearing, tympanic membrane, ear canal and external ear normal  No decreased hearing noted  Swelling and tenderness present   No laceration or drainage  No middle ear effusion  There is no impacted cerumen  No mastoid tenderness  Tympanic membrane is not injected, scarred, perforated, erythematous, retracted or bulging  Tympanic membrane has normal mobility  Left Ear: Hearing, tympanic membrane, ear canal and external ear normal  No decreased hearing noted  No laceration, drainage, swelling or tenderness  No middle ear effusion  There is no impacted cerumen  Ears:        Nose: Nose normal  No congestion or rhinorrhea  Mouth/Throat:      Mouth: Mucous membranes are moist       Pharynx: Oropharynx is clear  No oropharyngeal exudate  Neck:      Musculoskeletal: Normal range of motion and neck supple  No neck rigidity or muscular tenderness  Cardiovascular:      Rate and Rhythm: Normal rate and regular rhythm  Pulmonary:      Effort: Pulmonary effort is normal       Breath sounds: Normal breath sounds  Lymphadenopathy:      Cervical: No cervical adenopathy  Neurological:      Mental Status: She is alert and oriented to person, place, and time  Mental status is at baseline  Psychiatric:         Mood and Affect: Mood normal          Behavior: Behavior normal          Vital Signs  ED Triage Vitals [04/30/21 1856]   Temperature Pulse Respirations Blood Pressure SpO2   97 5 °F (36 4 °C) 99 16 119/63 99 %      Temp Source Heart Rate Source Patient Position - Orthostatic VS BP Location FiO2 (%)   Temporal Monitor Sitting Left arm --      Pain Score       --           Vitals:    04/30/21 1856   BP: 119/63   Pulse: 99   Patient Position - Orthostatic VS: Sitting         Visual Acuity      ED Medications  Medications - No data to display    Diagnostic Studies  Results Reviewed     None                 No orders to display                  ED Course  ED Course as of Apr 30 2256 Fri Apr 30, 2021   1935 Patient discharged in stable condition at this time  Ciprofloxacin instructions given, Including black box warnings and adverse effects  Patient expresses understanding  Return to emergency department precautions given  Discontinue Bactrim  Given appropriate follow-up with PCP as well as ENT  Patient ambulated off department              MDM  Number of Diagnoses or Management Options  Perichondritis of auricle, right: new and requires workup  Diagnosis management comments: Patient was seen and examined by me and Dr Rachel Davis in the emergency department  The patient presented with right ear pain after recent piercing  Patient removed piercing approximately 2 days after that have resulted redness itching and swelling  Prescribed Bactrim by outside provider  Exam significant for red painful upper 1/3 of helix  Disposition:  General impression is a 43-year-old female presents after piercing resulting in red painful upper 1/3 of the helix concerning for perichondritis  No signs of abscess or hematoma  Will prescribe Cipro he place of Bactrim  Black box warnings discussed, risk and benefits discussed, patient agreeable to plan  Appropriate follow-up with PCP ENT emergency for worsening symptoms  Patient expressed understanding  Patient remained stable during the entire 1 hour 1 minutes stay in the emergency department patient ambulated off the department stable condition      Risk of Complications, Morbidity, and/or Mortality  Presenting problems: low  Diagnostic procedures: low  Management options: low    Patient Progress  Patient progress: stable      Disposition  Final diagnoses:   Perichondritis of auricle, right     Time reflects when diagnosis was documented in both MDM as applicable and the Disposition within this note     Time User Action Codes Description Comment    4/30/2021  7:20 PM Adri Adler Add [[id number] 001] Perichondritis of auricle, right       ED Disposition     ED Disposition Condition Date/Time Comment    Discharge Stable Fri Apr 30, 2021  7:19 PM Marco Magana discharge to home/self care              Follow-up Information Follow up With Specialties Details Why Contact Info Additional [address] Emergency Department Emergency Medicine Go to  As needed, If symptoms worsen [address] [facility] [address] [facility] Emergency Department, [address], [place], [address]   Primary care physician follow-up  [phone]       Adult & Child Ear, Nose & Throat Otolaryngology Go to  If symptoms worsen [address] [facility] [id number]  [address] [facility], [address] [place] [id number]          Discharge Medication List as of 4/30/2021  7:32 PM      START taking these medications    Details   ciprofloxacin (CIPRO) 500 mg tablet Take 1 tablet (500 mg total) by mouth every 12 (twelve) hours for 7 days, Starting Fri 4/30/2021, Until Fri 5/7/2021, Normal         CONTINUE these medications which have NOT CHANGED    Details   Etonogestrel (Sharyon Layer) 68 MG IMPL Inject under the skin, Historical Med      multivitamin (THERAGRAN) TABS Take 1 tablet by mouth daily, Historical Med           No discharge procedures on file      PDMP Review     None          ED Provider  Electronically Signed by           Madelaine Petersen PA-C  04/30/21 7462

## 2021-04-30 NOTE — DISCHARGE INSTRUCTIONS
You were seen in the emergency department today for an infection of your right ear after a piercing  You were prescribed ciprofloxacin for your symptoms, please take as directed  Please return to the emergency department if your symptoms worsen, including, worsening pain, discharge, swelling, fevers, chills, vomiting, diarrhea, red streaking, swelling into the neck, or any other concerning symptom  Please follow up with your primary care provider regarding your symptoms  You may follow up with an Ear Nose and Throat provider as needed  Bacterial perichondritis is an infection of the perichondrium, which is the tissue that surrounds and nourishes the cartilage which makes up the outer part of your ear  There are two common types of perichondritis: bacterial or infectious and autoimmune  This article will focus primarily on bacterial perichondritis  Without proper and prompt treatment, perichondritis can cause a permanent cosmetic change

## 2021-04-30 NOTE — ED ATTENDING ATTESTATION
4/30/2021  I, sEme Vega MD, saw and evaluated the patient  I have discussed the patient with the resident/non-physician practitioner and agree with the resident's/non-physician practitioner's findings, Plan of Care, and MDM as documented in the resident's/non-physician practitioner's note, except where noted  All available labs and Radiology studies were reviewed  I was present for key portions of any procedure(s) performed by the resident/non-physician practitioner and I was immediately available to provide assistance  At this point I agree with the current assessment done in the Emergency Department  I have conducted an independent evaluation of this patient a history and physical is as follows:    14-year-old female presenting for evaluation of pain and swelling to the helix of the right ear after piercing a few days ago  She removed the piercing after 2 days when she noticed some redness around it  She saw a doctor via tele health visit yesterday and was prescribed Bactrim  Decided to come in for in 1st than re-evaluation here today  States that she thinks the redness is mildly improved but the swelling is the same  No pain within the ear  Denies fevers  Nontoxic  Very mild perichondritis noted to the right helix around the area of the patient's new piercing which has subsequently been removed  New piercing to the right tragus appears normal without surrounding erythema or swelling  Normal appearance to the ear canal and no evidence of otitis media  Will discontinue Bactrim and start course of Cipro for pseudomonal coverage after discussion of black box warning  Return precautions discussed      ED Course         Critical Care Time  Procedures

## 2021-05-16 ENCOUNTER — IMMUNIZATIONS (OUTPATIENT)
Dept: FAMILY MEDICINE CLINIC | Facility: HOSPITAL | Age: 36
End: 2021-05-16

## 2021-05-16 DIAGNOSIS — Z23 ENCOUNTER FOR IMMUNIZATION: Primary | ICD-10-CM

## 2021-05-16 PROCEDURE — 0002A SARS-COV-2 / COVID-19 MRNA VACCINE (PFIZER-BIONTECH) 30 MCG: CPT | Performed by: PHYSICIAN ASSISTANT

## 2021-05-16 PROCEDURE — 91300 SARS-COV-2 / COVID-19 MRNA VACCINE (PFIZER-BIONTECH) 30 MCG: CPT | Performed by: PHYSICIAN ASSISTANT

## 2021-11-17 ENCOUNTER — NURSE TRIAGE (OUTPATIENT)
Dept: OTHER | Facility: OTHER | Age: 36
End: 2021-11-17

## 2021-11-17 DIAGNOSIS — Z20.828 EXPOSURE TO SARS-ASSOCIATED CORONAVIRUS: Primary | ICD-10-CM

## 2021-11-17 PROCEDURE — U0003 INFECTIOUS AGENT DETECTION BY NUCLEIC ACID (DNA OR RNA); SEVERE ACUTE RESPIRATORY SYNDROME CORONAVIRUS 2 (SARS-COV-2) (CORONAVIRUS DISEASE [COVID-19]), AMPLIFIED PROBE TECHNIQUE, MAKING USE OF HIGH THROUGHPUT TECHNOLOGIES AS DESCRIBED BY CMS-2020-01-R: HCPCS | Performed by: FAMILY MEDICINE

## 2021-11-17 PROCEDURE — U0005 INFEC AGEN DETEC AMPLI PROBE: HCPCS | Performed by: FAMILY MEDICINE

## 2021-11-18 ENCOUNTER — TELEPHONE (OUTPATIENT)
Dept: OTHER | Facility: OTHER | Age: 36
End: 2021-11-18

## 2021-11-19 ENCOUNTER — TELEMEDICINE (OUTPATIENT)
Dept: FAMILY MEDICINE CLINIC | Facility: CLINIC | Age: 36
End: 2021-11-19

## 2021-11-19 DIAGNOSIS — U07.1 COVID-19: Primary | ICD-10-CM

## 2021-11-19 PROCEDURE — 99213 OFFICE O/P EST LOW 20 MIN: CPT | Performed by: FAMILY MEDICINE

## 2021-11-23 ENCOUNTER — TELEMEDICINE (OUTPATIENT)
Dept: FAMILY MEDICINE CLINIC | Facility: CLINIC | Age: 36
End: 2021-11-23

## 2021-11-23 ENCOUNTER — TELEPHONE (OUTPATIENT)
Dept: FAMILY MEDICINE CLINIC | Facility: CLINIC | Age: 36
End: 2021-11-23

## 2021-11-23 DIAGNOSIS — U07.1 COVID-19: Primary | ICD-10-CM

## 2021-11-23 PROCEDURE — 99213 OFFICE O/P EST LOW 20 MIN: CPT | Performed by: FAMILY MEDICINE

## 2022-04-05 ENCOUNTER — OFFICE VISIT (OUTPATIENT)
Dept: FAMILY MEDICINE CLINIC | Facility: CLINIC | Age: 37
End: 2022-04-05

## 2022-04-05 VITALS
RESPIRATION RATE: 18 BRPM | HEIGHT: 60 IN | SYSTOLIC BLOOD PRESSURE: 114 MMHG | BODY MASS INDEX: 23.44 KG/M2 | TEMPERATURE: 98.2 F | WEIGHT: 119.4 LBS | HEART RATE: 98 BPM | DIASTOLIC BLOOD PRESSURE: 74 MMHG | OXYGEN SATURATION: 100 %

## 2022-04-05 DIAGNOSIS — Z30.011 OCP (ORAL CONTRACEPTIVE PILLS) INITIATION: ICD-10-CM

## 2022-04-05 DIAGNOSIS — Z00.00 ANNUAL PHYSICAL EXAM: Primary | ICD-10-CM

## 2022-04-05 DIAGNOSIS — Z13.6 SCREENING FOR CARDIOVASCULAR CONDITION: ICD-10-CM

## 2022-04-05 PROBLEM — M79.601 RIGHT ARM PAIN: Status: RESOLVED | Noted: 2017-06-05 | Resolved: 2022-04-05

## 2022-04-05 PROBLEM — Z30.46 NEXPLANON REMOVAL: Status: RESOLVED | Noted: 2020-10-22 | Resolved: 2022-04-05

## 2022-04-05 LAB — SL AMB POCT URINE HCG: NEGATIVE

## 2022-04-05 PROCEDURE — 3008F BODY MASS INDEX DOCD: CPT | Performed by: FAMILY MEDICINE

## 2022-04-05 PROCEDURE — 1036F TOBACCO NON-USER: CPT | Performed by: FAMILY MEDICINE

## 2022-04-05 PROCEDURE — 3725F SCREEN DEPRESSION PERFORMED: CPT | Performed by: FAMILY MEDICINE

## 2022-04-05 PROCEDURE — 81025 URINE PREGNANCY TEST: CPT | Performed by: FAMILY MEDICINE

## 2022-04-05 PROCEDURE — 99395 PREV VISIT EST AGE 18-39: CPT | Performed by: FAMILY MEDICINE

## 2022-04-05 RX ORDER — NORGESTIMATE AND ETHINYL ESTRADIOL 7DAYSX3 LO
1 KIT ORAL DAILY
Qty: 84 TABLET | Refills: 3 | Status: SHIPPED | OUTPATIENT
Start: 2022-04-05

## 2022-04-05 NOTE — PROGRESS NOTES
[address] [facility]    NAME: Jaimee Amaya  AGE: 40 y o  SEX: female  : 1985     DATE: 2022     Assessment and Plan:     Problem List Items Addressed This Visit        Other    OCP (oral contraceptive pills) initiation     No contraindications to starting OCP therapy for intermenstrual spotting, negative UPT in office, will start on Ortho Tri-Cyclen  Other options including IUD and Depo were discussed as well during this visit  Relevant Medications    norgestimate-ethinyl estradiol (Ortho Tri-Cyclen Lo) 0 18/0 215/0 25 MG-25 MCG per tablet    Other Relevant Orders    POCT urine HCG (Completed)    Annual physical exam - Primary     - H/o CS, GERD, seasonal allergies  - Screening for cardiovascular disease: yearly BMP and Lipid panel ordered today   - Family history of HLD on review  - Screening for colorectal cancer: no family history, no current problems, due for screening starting at age 39  - Screening for cervical cancer: due for Pap and HPV co-testing in 2022, last pap with co-testing normal  - Screening for breast cancer: no family history, no current concerns, will start biennial screening at 48  - Vaccinations: up to date  - Depression screenin  - Counseled the patient on healthy lifestyle habits             Other Visit Diagnoses     Screening for cardiovascular condition        Relevant Orders    Basic metabolic panel    Lipid panel          Immunizations and preventive care screenings were discussed with patient today  Appropriate education was printed on patient's after visit summary  Counseling:  Alcohol/drug use: discussed moderation in alcohol intake, the recommendations for healthy alcohol use, and avoidance of illicit drug use  Dental Health: discussed importance of regular tooth brushing, flossing, and dental visits    Injury prevention: discussed safety/seat belts, safety helmets, smoke detectors, carbon dioxide detectors, and smoking near bedding or upholstery  Sexual health: discussed sexually transmitted diseases, partner selection, use of condoms, avoidance of unintended pregnancy, and contraceptive alternatives  · Exercise: the importance of regular exercise/physical activity was discussed  Recommend exercise 3-5 times per week for at least 30 minutes  Depression Screening and Follow-up Plan: Patient was screened for depression during today's encounter  They screened negative with a PHQ-2 score of 0  Return in 1 year (on 4/5/2023)  Chief Complaint:     Chief Complaint   Patient presents with    Annual Exam     pt has no concerns      History of Present Illness:     Adult Annual Physical   Patient here for a comprehensive physical exam  The patient reports:  - Spotting between monthly periods that lasts 3-5 days  Diet and Physical Activity  · Diet/Nutrition: well balanced diet, consuming 3-5 servings of fruits/vegetables daily and adequate fiber intake  · Exercise: no formal exercise  Depression Screening  PHQ-2/9 Depression Screening    Little interest or pleasure in doing things: 0 - not at all  Feeling down, depressed, or hopeless: 0 - not at all  PHQ-2 Score: 0  PHQ-2 Interpretation: Negative depression screen       General Health  · Sleep: sleeps well and gets 7-8 hours of sleep on average  · Hearing: normal - bilateral   · Vision: most recent eye exam <1 year ago and wears glasses  · Dental: regular dental visits, brushes teeth twice daily and flosses teeth occasionally  · Does not smoke, vape, or use other drugs; sporadic alcohol    /GYN Health  · Last menstrual period: 3/15/22, lasts 5 days, uses pads, regular flow, no heavy flow, + intermenstrual spotting  · Contraceptive method: none, history of tubal   · History of STDs?: no      Review of Systems:     Review of Systems   Constitutional: Negative  HENT: Negative  Respiratory: Negative  Cardiovascular: Negative  Gastrointestinal: Negative  Genitourinary: Negative  Musculoskeletal: Negative for arthralgias, back pain and neck pain  Allergic/Immunologic: Positive for environmental allergies  Negative for food allergies  Neurological: Negative for dizziness, weakness, light-headedness and headaches  Psychiatric/Behavioral: Negative  Past Medical History:     Past Medical History:   Diagnosis Date    GERD (gastroesophageal reflux disease)     diet controlled    Migraine       Past Surgical History:     Past Surgical History:   Procedure Laterality Date     SECTION      x3    CYST REMOVAL      Pilonidal    CYST REMOVAL      ganglion    [place] EGD TRANSORAL BIOPSY SINGLE/MULTIPLE N/A 2017    Procedure: ESOPHAGOGASTRODUODENOSCOPY (EGD); Surgeon: Ginny Vargas DO;  [facility];   Service: Gastroenterology    [place] LAP,RMV  ADNEXAL STRUCTURE Bilateral 2018    Procedure: LAPAROSCOPIC SALPINGECTOMY;  Surgeon: Ale Lord MD;  [facility];  Service: Gynecology    WISDOM TOOTH EXTRACTION        Social History:     Social History     Socioeconomic History    Marital status: /Civil Union     Spouse name: None    Number of children: None    Years of education: None    Highest education level: None   Occupational History     Employer: [employer]   Tobacco Use    Smoking status: Never Smoker    Smokeless tobacco: Never Used   Vaping Use    Vaping Use: Never used   Substance and Sexual Activity    Alcohol use: Not Currently     Comment: rarely    Drug use: No    Sexual activity: Yes     Partners: Male     Birth control/protection: Implant   Other Topics Concern    None   Social History Narrative    Uses seatbelts     Social Determinants of Health     Financial Resource Strain: Low Risk     Difficulty of Paying Living Expenses: Not hard at all   Food Insecurity: No Food Insecurity    Worried About Running Out of Food in the Last Year: Never true    Ran Out of Food in the Last Year: Never true   Transportation Needs: Not on file   Physical Activity: Not on file   Stress: Not on file   Social Connections: Not on file   Intimate Partner Violence: Not on file   Housing Stability: Not on file      Family History:     Family History   Problem Relation Age of Onset    No Known Problems Mother     No Known Problems Father       Current Medications:     Current Outpatient Medications   Medication Sig Dispense Refill    multivitamin (THERAGRAN) TABS Take 1 tablet by mouth daily      norgestimate-ethinyl estradiol (Ortho Tri-Cyclen Lo) 0 18/0 215/0 25 MG-25 MCG per tablet Take 1 tablet by mouth daily 84 tablet 3     No current facility-administered medications for this visit  Allergies: Allergies   Allergen Reactions    Pollen Extract       Physical Exam:     /74 (BP Location: Right arm, Patient Position: Sitting, Cuff Size: Standard)   Pulse 98   Temp 98 2 °F (36 8 °C) (Temporal)   Resp 18   Ht 5' (1 524 m)   Wt 54 2 kg (119 lb 6 4 oz)   SpO2 100%   BMI 23 32 kg/m²     Physical Exam  Vitals reviewed  Constitutional:       Appearance: Normal appearance  She is normal weight  HENT:      Head: Normocephalic and atraumatic  Nose: Nose normal       Mouth/Throat:      Mouth: Mucous membranes are moist       Pharynx: Oropharynx is clear  Eyes:      Extraocular Movements: Extraocular movements intact  Conjunctiva/sclera: Conjunctivae normal    Cardiovascular:      Rate and Rhythm: Normal rate and regular rhythm  Pulses: Normal pulses  Heart sounds: Normal heart sounds  Pulmonary:      Effort: Pulmonary effort is normal       Breath sounds: Normal breath sounds  Musculoskeletal:         General: Normal range of motion  Skin:     General: Skin is warm  Neurological:      Mental Status: She is alert  Mental status is at baseline     Psychiatric:         Mood and Affect: Mood normal          Behavior: Behavior normal           Chevy Guzman MD   [address]

## 2022-04-05 NOTE — ASSESSMENT & PLAN NOTE
- H/o CS, GERD, seasonal allergies  - Screening for cardiovascular disease: yearly BMP and Lipid panel ordered today   - Family history of HLD on review  - Screening for colorectal cancer: no family history, no current problems, due for screening starting at age 39  - Screening for cervical cancer: due for Pap and HPV co-testing in 2022, last pap with co-testing normal  - Screening for breast cancer: no family history, no current concerns, will start biennial screening at 48  - Vaccinations: up to date  - Depression screenin  - Counseled the patient on healthy lifestyle habits

## 2022-04-05 NOTE — ASSESSMENT & PLAN NOTE
No contraindications to starting OCP therapy for intermenstrual spotting, negative UPT in office, will start on Ortho Tri-Cyclen  Other options including IUD and Depo were discussed as well during this visit

## 2022-04-05 NOTE — PATIENT INSTRUCTIONS

## 2022-04-12 ENCOUNTER — APPOINTMENT (OUTPATIENT)
Dept: LAB | Age: 37
End: 2022-04-12
Payer: COMMERCIAL

## 2022-04-12 DIAGNOSIS — Z13.6 SCREENING FOR CARDIOVASCULAR CONDITION: ICD-10-CM

## 2022-04-12 LAB
ANION GAP SERPL CALCULATED.3IONS-SCNC: 4 MMOL/L (ref 4–13)
BUN SERPL-MCNC: 11 MG/DL (ref 5–25)
CALCIUM SERPL-MCNC: 9.2 MG/DL (ref 8.3–10.1)
CHLORIDE SERPL-SCNC: 106 MMOL/L (ref 100–108)
CHOLEST SERPL-MCNC: 177 MG/DL
CO2 SERPL-SCNC: 28 MMOL/L (ref 21–32)
CREAT SERPL-MCNC: 0.79 MG/DL (ref 0.6–1.3)
GFR SERPL CREATININE-BSD FRML MDRD: 95 ML/MIN/1.73SQ M
GLUCOSE P FAST SERPL-MCNC: 92 MG/DL (ref 65–99)
HDLC SERPL-MCNC: 51 MG/DL
LDLC SERPL CALC-MCNC: 103 MG/DL (ref 0–100)
NONHDLC SERPL-MCNC: 126 MG/DL
POTASSIUM SERPL-SCNC: 4.1 MMOL/L (ref 3.5–5.3)
SODIUM SERPL-SCNC: 138 MMOL/L (ref 136–145)
TRIGL SERPL-MCNC: 117 MG/DL

## 2022-04-12 PROCEDURE — 80061 LIPID PANEL: CPT

## 2022-04-12 PROCEDURE — 36415 COLL VENOUS BLD VENIPUNCTURE: CPT

## 2022-04-12 PROCEDURE — 80048 BASIC METABOLIC PNL TOTAL CA: CPT

## 2022-11-01 ENCOUNTER — TELEPHONE (OUTPATIENT)
Dept: OBGYN CLINIC | Facility: HOSPITAL | Age: 37
End: 2022-11-01

## 2022-11-01 ENCOUNTER — OFFICE VISIT (OUTPATIENT)
Dept: FAMILY MEDICINE CLINIC | Facility: CLINIC | Age: 37
End: 2022-11-01

## 2022-11-01 VITALS
WEIGHT: 120 LBS | TEMPERATURE: 97.6 F | DIASTOLIC BLOOD PRESSURE: 64 MMHG | RESPIRATION RATE: 16 BRPM | BODY MASS INDEX: 23.44 KG/M2 | HEART RATE: 93 BPM | SYSTOLIC BLOOD PRESSURE: 101 MMHG

## 2022-11-01 DIAGNOSIS — M67.40 GANGLION CYST: Primary | ICD-10-CM

## 2022-11-01 NOTE — PROGRESS NOTES
Name: Nola Salinas      : 1985      MRN: [id number]  Encounter Provider: Laura Pisano MD  Encounter Date: 2022   Encounter department: [address] [place] [address]  Ganglion cyst  Assessment & Plan:  Pt with ganglion cyst, volar aspect of right wrist   Patient with Hx of ganglion cyst same location in , s/p surgical intervention  Asymptomatic  Referral given to Hand surgery  Follow up PRN       Orders:  -     Ambulatory Referral to Hand Surgery; Future         Subjective      40 YOF with Hx of ganglion cyst presents with cyst in her right hand volar aspect, same location as previous cyst  Pt reports she started noticing it for the last 2 weeks, denies numbness, tingling of fingers, no weakness, no swelling, redness, pain of right wrist  No recent trauma or injury  Review of Systems   Constitutional: Negative for fever  Eyes: Negative for visual disturbance  Respiratory: Negative for cough and shortness of breath  Cardiovascular: Negative  Gastrointestinal: Negative for nausea  Genitourinary: Negative  Musculoskeletal: Negative for arthralgias  Skin: Negative for color change and rash  Cyst    Neurological: Negative for weakness, numbness and headaches  All other systems reviewed and are negative  Current Outpatient Medications on File Prior to Visit   Medication Sig   • multivitamin (THERAGRAN) TABS Take 1 tablet by mouth daily   • norgestimate-ethinyl estradiol (Ortho Tri-Cyclen Lo) 0 18/0 215/0 25 MG-25 MCG per tablet Take 1 tablet by mouth daily       Objective     /64   Pulse 93   Temp 97 6 °F (36 4 °C)   Resp 16   Wt 54 4 kg (120 lb)   BMI 23 44 kg/m²     Physical Exam  Vitals reviewed  Constitutional:       General: She is not in acute distress  Appearance: Normal appearance  HENT:      Head: Normocephalic and atraumatic     Eyes:      Conjunctiva/sclera: Conjunctivae normal  Cardiovascular:      Rate and Rhythm: Normal rate and regular rhythm  Pulmonary:      Effort: Pulmonary effort is normal  No respiratory distress  Breath sounds: Normal breath sounds  Musculoskeletal:         General: Swelling present  No tenderness, deformity or signs of injury  Right lower leg: No edema  Left lower leg: No edema  Comments: Right wrist swelling volar aspect radial side around 2cm in diameter  Skin:     General: Skin is warm and dry  Neurological:      Mental Status: She is alert and oriented to person, place, and time        Gait: Gait normal    Psychiatric:         Mood and Affect: Mood normal          Behavior: Behavior normal        Karla Bolanos MD

## 2022-11-01 NOTE — ASSESSMENT & PLAN NOTE
Pt with ganglion cyst, volar aspect of right wrist   Patient with Hx of ganglion cyst same location in 2007, s/p surgical intervention     Asymptomatic  Referral given to Hand surgery  Follow up PRN

## 2022-11-01 NOTE — TELEPHONE ENCOUNTER
Caller: Patient    Doctor: Alhaji Miles    Reason for call: Patient needs to schedule an appt with Dr Alhaji Miles for right wrist, ganglion cyst  Referral in chart    Call back#: [phone]

## 2022-12-01 ENCOUNTER — OFFICE VISIT (OUTPATIENT)
Dept: OBGYN CLINIC | Facility: CLINIC | Age: 37
End: 2022-12-01

## 2022-12-01 ENCOUNTER — APPOINTMENT (OUTPATIENT)
Dept: RADIOLOGY | Facility: AMBULARY SURGERY CENTER | Age: 37
End: 2022-12-01
Attending: SURGERY

## 2022-12-01 VITALS
HEART RATE: 91 BPM | RESPIRATION RATE: 16 BRPM | SYSTOLIC BLOOD PRESSURE: 108 MMHG | BODY MASS INDEX: 23.44 KG/M2 | HEIGHT: 60 IN | WEIGHT: 119.4 LBS | DIASTOLIC BLOOD PRESSURE: 65 MMHG

## 2022-12-01 DIAGNOSIS — M25.531 PAIN IN RIGHT WRIST: ICD-10-CM

## 2022-12-01 DIAGNOSIS — M67.431 GANGLION CYST OF VOLAR ASPECT OF RIGHT WRIST: Primary | ICD-10-CM

## 2022-12-01 RX ORDER — CHLORHEXIDINE GLUCONATE 0.12 MG/ML
15 RINSE ORAL ONCE
OUTPATIENT
Start: 2022-12-01 | End: 2022-12-01

## 2022-12-01 NOTE — H&P
Nury REILLY  Attending, Orthopaedic Surgery  Hand, Wrist, and Elbow Surgery  [facility]      ORTHOPAEDIC HAND, WRIST, AND ELBOW OFFICE  VISIT       ASSESSMENT/PLAN:      39 yo female with recurrent right volar ganglion cyst     Xrays were reviewed which demonstrate no osseous abnormalities  Patient would ultimately like the mass removed  We discussed that since this mass has recurred once already there is about a 25% chance it may recur again  I would like to order an ultrasound evaluation of the mass prior to surgery to get a better idea of its location in relation to the radial artery  Risks benefits alternatives were discussed and informed consent was signed today for right volar radial ganglion cyst removal   Postoperative protocol and expectations were discussed and all questions answered to the best of our ability  We will see her back 10-14 days following surgery for postop evaluation and suture removal    The patient verbalized understanding of exam findings and treatment plan  We engaged in the shared decision-making process and treatment options were discussed at length with the patient  Surgical and conservative management discussed today along with risks and benefits  Diagnoses and all orders for this visit:    Ganglion cyst of volar aspect of right wrist  -     Ambulatory Referral to Hand Surgery  -     XR wrist 3+ vw right; Future  -     US extremity soft tissue; Future  -     Case request operating room: EXCISION GANGLION CYST; Standing  -     Case request operating room: EXCISION GANGLION CYST    Other orders  -     Nursing Communication [facility] Interventions Implemented; Standing  -     Nursing Communication CHG bath, have staff wash entire body (neck down) per pre-op bathing protocol   Routine, evening prior to, and day of surgery ; Standing  -     Nursing Communication Swab both nares with Povidone-Iodine solution, EXCLUDE if patient has shellfish/Iodine allergy  Routine, day of surgery, on call to OR; Standing  -     chlorhexidine (PERIDEX) 0 12 % oral rinse 15 mL  -     Void on call to OR; Standing  -     Insert peripheral IV; Standing  -     Diet NPO; Sips with meds; Standing  -     ceFAZolin (ANCEF) 2,000 mg in dextrose 5 % 100 mL IVPB        Follow Up:  Return for After Surgery  To Do Next Visit:  Re-evaluation of current issue and Sutures out      General Discussions:  Ganglion Cysts: The anatomy and physiology of the ganglion was discussed with the patient today in the office  Fluid leaking out of the joint surface typically creates a small sac, which can enlarge and cause pain or limitation of motion  Treatment options include observation, aspiration, or surgical incision were discussed with the patient today  Observation typically lead to resolution and approximately 10% of patients, aspiration results in resolution of approximately 50% of patients, and surgical excision leads to resolution in approximately 97% of patients  After discussion with the patient today, the patient voiced understanding of all treatment options  Operative Discussions:  Ganglion Cyst Excision: The anatomy and physiology of the ganglion was discussed with the patient today in the office  Fluid leaking out of the joint surface typically creates a small sac, which can enlarge and cause pain or limitation of motion  Treatment options include observation, aspiration, or surgical incision were discussed with the patient today  Observation typically lead to resolution and approximately 10% of patients, aspiration least resolution approximately 50% of patients, and surgical excision lead to resolution in approximately 97% of patients  After discussion with the patient today, the patient voiced understanding of all treatment options  The patient has elected excision of the ganglion  The risks and benefits of the procedure were explained to the patient, which include, but are not limited to: Bleeding, infection, recurrence, pain, scar, damage to tendons, damage to nerves, and damage to blood vessels, failure to give desired results and complications related to anesthesia  These risks, along with alternative conservative treatment options, and postoperative protocols were voiced back and understood by the patient  All questions were answered to the patient's satisfaction  The patient agrees to comply with a standard postoperative protocol, and is willing to proceed  Education was provided via written and auditory forms  There were no barriers to learning  Written handouts regarding wound care, incision and scar care, and general preoperative information was provided to the patient  Prior to surgery, the patient may be requested to stop all anti-inflammatory medications  Prophylactic aspirin, Plavix, and Coumadin may be allowed to be continued  Medications including vitamin E , ginkgo, and fish oil are requested to be stopped approximately one week prior to surgery  Hypertensive medications and beta blockers, if taken, should be continued  ____________________________________________________________________________________________________________________________________________      CHIEF COMPLAINT:  Chief Complaint   Patient presents with   • Right Wrist - Pain       SUBJECTIVE:  Joan Diaz is a 40y o  year old RHD female seen in consultation requested by Dr Ruth Berkowitz who presents for evaluation of a right volar wrist mass  She has history of the same mass in 2007 which was successfully removed surgically  Mass then recurred recently back in November   It is locally bothersome       Pain/symptom timing:  Worse during the day when active  Pain/symptom context:  Worse with activites and work  Pain/symptom modifying factors:  Rest makes better, activities make worse  Pain/symptom associated signs/symptoms: none    Prior treatment   · NSAIDsNo   · Injections No · Bracing/Orthotics No    Physical Therapy No     I have personally reviewed all the relevant PMH, PSH, SH, FH, Medications and allergies      PAST MEDICAL HISTORY:  Past Medical History:   Diagnosis Date   • GERD (gastroesophageal reflux disease)     diet controlled   • Migraine        PAST SURGICAL HISTORY:  Past Surgical History:   Procedure Laterality Date   •  SECTION      x3   • CYST REMOVAL      Pilonidal   • CYST REMOVAL      ganglion   • [place] EGD TRANSORAL BIOPSY SINGLE/MULTIPLE N/A 2017    Procedure: ESOPHAGOGASTRODUODENOSCOPY (EGD); Surgeon: Adri Hannon DO;  Location: [facility]; Service: Gastroenterology   • [place] LAP,RMV  ADNEXAL STRUCTURE Bilateral 2018    Procedure: LAPAROSCOPIC SALPINGECTOMY;  Surgeon: Shreya Ventura MD;  [facility];  Service: Gynecology   • WISDOM TOOTH EXTRACTION         FAMILY HISTORY:  Family History   Problem Relation Age of Onset   • No Known Problems Mother    • No Known Problems Father        SOCIAL HISTORY:  Social History     Tobacco Use   • Smoking status: Never   • Smokeless tobacco: Never   Vaping Use   • Vaping Use: Never used   Substance Use Topics   • Alcohol use: Not Currently     Comment: rarely   • Drug use: No       MEDICATIONS:    Current Outpatient Medications:   •  multivitamin (THERAGRAN) TABS, Take 1 tablet by mouth daily, Disp: , Rfl:   •  norgestimate-ethinyl estradiol (Ortho Tri-Cyclen Lo) 0 18/0 215/0 25 MG-25 MCG per tablet, Take 1 tablet by mouth daily, Disp: 84 tablet, Rfl: 3    ALLERGIES:  Allergies   Allergen Reactions   • Pollen Extract Other (See Comments)     Seasonal allergies  REVIEW OF SYSTEMS:  Review of Systems   Constitutional: Negative for chills, fatigue and fever  HENT: Negative for hearing loss, nosebleeds and sore throat  Eyes: Negative for redness and visual disturbance  Respiratory: Negative for cough, shortness of breath and wheezing      Cardiovascular: Negative for chest pain, palpitations and leg swelling  Gastrointestinal: Negative for abdominal pain, nausea and vomiting  Endocrine: Negative for polydipsia and polyuria  Genitourinary: Negative for difficulty urinating, dysuria and hematuria  Musculoskeletal: Positive for arthralgias  Negative for joint swelling and myalgias  Skin: Negative for rash and wound  Neurological: Negative for speech difficulty, weakness, numbness and headaches  Psychiatric/Behavioral: Negative for decreased concentration and suicidal ideas  The patient is not nervous/anxious  VITALS:  Vitals:    12/01/22 0941   BP: 108/65   Pulse: 91   Resp: 16       LABS:  HgA1c: No results found for: HGBA1C  BMP:   Lab Results   Component Value Date    GLUCOSE 83 11/06/2015    CALCIUM 9 2 04/12/2022     11/06/2015    K 4 1 04/12/2022    CO2 28 04/12/2022     04/12/2022    BUN 11 04/12/2022    CREATININE 0 79 04/12/2022       _____________________________________________________  PHYSICAL EXAMINATION:  General: well developed and well nourished, alert, oriented times 3 and appears comfortable  Psychiatric: Normal  HEENT: Normocephalic, Atraumatic Trachea Midline, No torticollis  Pulmonary: No audible wheezing or respiratory distress   Abdomen/GI: Non tender, non distended   Cardiovascular: No pitting edema, 2+ radial pulse   Skin: No masses, erythema, lacerations, fluctation, ulcerations  Neurovascular: Sensation Intact to the Median, Ulnar, Radial Nerve, Motor Intact to the Median, Ulnar, Radial Nerve and Pulses Intact  Musculoskeletal: Normal, except as noted in detailed exam and in HPI  MUSCULOSKELETAL EXAMINATION:  Right wrist  Volar radial wrist mass, about pea sized  Range of motion of the right wrist  is 80 degrees flexion, 80 degrees extension, 90 degrees pronation,90 degrees supination  There is no swelling present  There is no ecchymosis noted      Pulses are present and capillary fill is normal      No overlying skin changes  ___________________________________________________  STUDIES REVIEWED:  I have personally reviewed AP lateral and oblique radiographs of right wrist which demonstrate no acute osseous abnormality   No degenerative changes           PROCEDURES PERFORMED:  Procedures  No Procedures performed today    _____________________________________________________      Scribe Attestation    I,:  Jonnathan Weber PA-C am acting as a scribe while in the presence of the attending physician :       I,:  Parag Moran MD personally performed the services described in this documentation    as scribed in my presence :

## 2022-12-01 NOTE — PROGRESS NOTES
Sosa REILLY  Attending, Orthopaedic Surgery  Hand, Wrist, and Elbow Surgery  [facility]      ORTHOPAEDIC HAND, WRIST, AND ELBOW OFFICE  VISIT       ASSESSMENT/PLAN:      39 yo female with recurrent right volar ganglion cyst     Xrays were reviewed which demonstrate no osseous abnormalities  Patient would ultimately like the mass removed  We discussed that since this mass has recurred once already there is about a 25% chance it may recur again  I would like to order an ultrasound evaluation of the mass prior to surgery to get a better idea of its location in relation to the radial artery  Risks benefits alternatives were discussed and informed consent was signed today for right volar radial ganglion cyst removal   Postoperative protocol and expectations were discussed and all questions answered to the best of our ability  We will see her back 10-14 days following surgery for postop evaluation and suture removal    The patient verbalized understanding of exam findings and treatment plan  We engaged in the shared decision-making process and treatment options were discussed at length with the patient  Surgical and conservative management discussed today along with risks and benefits  Diagnoses and all orders for this visit:    Ganglion cyst of volar aspect of right wrist  -     Ambulatory Referral to Hand Surgery  -     XR wrist 3+ vw right; Future  -     US extremity soft tissue; Future  -     Case request operating room: EXCISION GANGLION CYST; Standing  -     Case request operating room: EXCISION GANGLION CYST    Other orders  -     Nursing Communication [facility] Interventions Implemented; Standing  -     Nursing Communication CHG bath, have staff wash entire body (neck down) per pre-op bathing protocol   Routine, evening prior to, and day of surgery ; Standing  -     Nursing Communication Swab both nares with Povidone-Iodine solution, EXCLUDE if patient has shellfish/Iodine allergy  Routine, day of surgery, on call to OR; Standing  -     chlorhexidine (PERIDEX) 0 12 % oral rinse 15 mL  -     Void on call to OR; Standing  -     Insert peripheral IV; Standing  -     Diet NPO; Sips with meds; Standing  -     ceFAZolin (ANCEF) 2,000 mg in dextrose 5 % 100 mL IVPB        Follow Up:  Return for After Surgery  To Do Next Visit:  Re-evaluation of current issue and Sutures out      General Discussions:  Ganglion Cysts: The anatomy and physiology of the ganglion was discussed with the patient today in the office  Fluid leaking out of the joint surface typically creates a small sac, which can enlarge and cause pain or limitation of motion  Treatment options include observation, aspiration, or surgical incision were discussed with the patient today  Observation typically lead to resolution and approximately 10% of patients, aspiration results in resolution of approximately 50% of patients, and surgical excision leads to resolution in approximately 97% of patients  After discussion with the patient today, the patient voiced understanding of all treatment options  Operative Discussions:  Ganglion Cyst Excision: The anatomy and physiology of the ganglion was discussed with the patient today in the office  Fluid leaking out of the joint surface typically creates a small sac, which can enlarge and cause pain or limitation of motion  Treatment options include observation, aspiration, or surgical incision were discussed with the patient today  Observation typically lead to resolution and approximately 10% of patients, aspiration least resolution approximately 50% of patients, and surgical excision lead to resolution in approximately 97% of patients  After discussion with the patient today, the patient voiced understanding of all treatment options  The patient has elected excision of the ganglion  The risks and benefits of the procedure were explained to the patient, which include, but are not limited to: Bleeding, infection, recurrence, pain, scar, damage to tendons, damage to nerves, and damage to blood vessels, failure to give desired results and complications related to anesthesia  These risks, along with alternative conservative treatment options, and postoperative protocols were voiced back and understood by the patient  All questions were answered to the patient's satisfaction  The patient agrees to comply with a standard postoperative protocol, and is willing to proceed  Education was provided via written and auditory forms  There were no barriers to learning  Written handouts regarding wound care, incision and scar care, and general preoperative information was provided to the patient  Prior to surgery, the patient may be requested to stop all anti-inflammatory medications  Prophylactic aspirin, Plavix, and Coumadin may be allowed to be continued  Medications including vitamin E , ginkgo, and fish oil are requested to be stopped approximately one week prior to surgery  Hypertensive medications and beta blockers, if taken, should be continued  ____________________________________________________________________________________________________________________________________________      CHIEF COMPLAINT:  Chief Complaint   Patient presents with   • Right Wrist - Pain       SUBJECTIVE:  Ana Edouard is a 40y o  year old RHD female seen in consultation requested by Dr Pierce Martinez who presents for evaluation of a right volar wrist mass  She has history of the same mass in 2007 which was successfully removed surgically  Mass then recurred recently back in November   It is locally bothersome       Pain/symptom timing:  Worse during the day when active  Pain/symptom context:  Worse with activites and work  Pain/symptom modifying factors:  Rest makes better, activities make worse  Pain/symptom associated signs/symptoms: none    Prior treatment   · NSAIDsNo   · Injections No · Bracing/Orthotics No    Physical Therapy No     I have personally reviewed all the relevant PMH, PSH, SH, FH, Medications and allergies      PAST MEDICAL HISTORY:  Past Medical History:   Diagnosis Date   • GERD (gastroesophageal reflux disease)     diet controlled   • Migraine        PAST SURGICAL HISTORY:  Past Surgical History:   Procedure Laterality Date   •  SECTION      x3   • CYST REMOVAL      Pilonidal   • CYST REMOVAL      ganglion   • TX EGD TRANSORAL BIOPSY SINGLE/MULTIPLE N/A 2017    Procedure: ESOPHAGOGASTRODUODENOSCOPY (EGD); Surgeon: Romana Barth, DO;  Location: [facility]; Service: Gastroenterology   • TX LAP,RMV  ADNEXAL STRUCTURE Bilateral 2018    Procedure: LAPAROSCOPIC SALPINGECTOMY;  Surgeon: Dayana Moncada MD;  Location: [facility];  Service: Gynecology   • WISDOM TOOTH EXTRACTION         FAMILY HISTORY:  Family History   Problem Relation Age of Onset   • No Known Problems Mother    • No Known Problems Father        SOCIAL HISTORY:  Social History     Tobacco Use   • Smoking status: Never   • Smokeless tobacco: Never   Vaping Use   • Vaping Use: Never used   Substance Use Topics   • Alcohol use: Not Currently     Comment: rarely   • Drug use: No       MEDICATIONS:    Current Outpatient Medications:   •  multivitamin (THERAGRAN) TABS, Take 1 tablet by mouth daily, Disp: , Rfl:   •  norgestimate-ethinyl estradiol (Ortho Tri-Cyclen Lo) 0 18/0 215/0 25 MG-25 MCG per tablet, Take 1 tablet by mouth daily, Disp: 84 tablet, Rfl: 3    ALLERGIES:  Allergies   Allergen Reactions   • Pollen Extract Other (See Comments)     Seasonal allergies  REVIEW OF SYSTEMS:  Review of Systems   Constitutional: Negative for chills, fatigue and fever  HENT: Negative for hearing loss, nosebleeds and sore throat  Eyes: Negative for redness and visual disturbance  Respiratory: Negative for cough, shortness of breath and wheezing      Cardiovascular: Negative for chest pain, palpitations and leg swelling  Gastrointestinal: Negative for abdominal pain, nausea and vomiting  Endocrine: Negative for polydipsia and polyuria  Genitourinary: Negative for difficulty urinating, dysuria and hematuria  Musculoskeletal: Positive for arthralgias  Negative for joint swelling and myalgias  Skin: Negative for rash and wound  Neurological: Negative for speech difficulty, weakness, numbness and headaches  Psychiatric/Behavioral: Negative for decreased concentration and suicidal ideas  The patient is not nervous/anxious  VITALS:  Vitals:    12/01/22 0941   BP: 108/65   Pulse: 91   Resp: 16       LABS:  HgA1c: No results found for: HGBA1C  BMP:   Lab Results   Component Value Date    GLUCOSE 83 11/06/2015    CALCIUM 9 2 04/12/2022     11/06/2015    K 4 1 04/12/2022    CO2 28 04/12/2022     04/12/2022    BUN 11 04/12/2022    CREATININE 0 79 04/12/2022       _____________________________________________________  PHYSICAL EXAMINATION:  General: well developed and well nourished, alert, oriented times 3 and appears comfortable  Psychiatric: Normal  HEENT: Normocephalic, Atraumatic Trachea Midline, No torticollis  Pulmonary: No audible wheezing or respiratory distress   Abdomen/GI: Non tender, non distended   Cardiovascular: No pitting edema, 2+ radial pulse   Skin: No masses, erythema, lacerations, fluctation, ulcerations  Neurovascular: Sensation Intact to the Median, Ulnar, Radial Nerve, Motor Intact to the Median, Ulnar, Radial Nerve and Pulses Intact  Musculoskeletal: Normal, except as noted in detailed exam and in HPI  MUSCULOSKELETAL EXAMINATION:  Right wrist  Volar radial wrist mass, about pea sized  Range of motion of the right wrist  is 80 degrees flexion, 80 degrees extension, 90 degrees pronation,90 degrees supination  There is no swelling present  There is no ecchymosis noted      Pulses are present and capillary fill is normal      No overlying skin changes  ___________________________________________________  STUDIES REVIEWED:  I have personally reviewed AP lateral and oblique radiographs of right wrist which demonstrate no acute osseous abnormality   No degenerative changes           PROCEDURES PERFORMED:  Procedures  No Procedures performed today    _____________________________________________________      Scribe Attestation    I,:  Shama Sequeira PA-C am acting as a scribe while in the presence of the attending physician :       I,:  Gladys Calles MD personally performed the services described in this documentation    as scribed in my presence :

## 2023-01-09 ENCOUNTER — HOSPITAL ENCOUNTER (OUTPATIENT)
Dept: ULTRASOUND IMAGING | Facility: HOSPITAL | Age: 38
Discharge: HOME | End: 2023-01-09

## 2023-01-09 DIAGNOSIS — M67.431 GANGLION CYST OF VOLAR ASPECT OF RIGHT WRIST: ICD-10-CM

## 2023-02-23 DIAGNOSIS — Z30.011 OCP (ORAL CONTRACEPTIVE PILLS) INITIATION: ICD-10-CM

## 2023-02-24 RX ORDER — NORGESTIMATE AND ETHINYL ESTRADIOL
KIT
Qty: 84 TABLET | Refills: 3 | Status: SHIPPED | OUTPATIENT
Start: 2023-02-24

## 2023-02-24 NOTE — TELEPHONE ENCOUNTER
Good Morning  Per chart review last time patient had a refill was on 04/05/2022 QTY:84 "3" refills by Dr Nella Norman  Last office visit was on 11/1/2022 with Dr Caroline Blair Dx: Ganglion Cyst which patient had schedule surgery on 03/21/2023  Please review and refill if appropriate  Thanks     Clerical Team  Please change pcp and schedule patient to establish   Thanks

## 2023-03-16 NOTE — PRE-PROCEDURE INSTRUCTIONS
Pre-Surgery Instructions:   Medication Instructions   • multivitamin (THERAGRAN) TABS Stop taking 7 days prior to surgery  • Tri-Lo-Stefani 0 18/0 215/0 25 MG-25 MCG per tablet Take night before surgery        Medication instructions for day surgery reviewed  Please use only a sip of water to take your instructed medications  Avoid all over the counter vitamins, supplements and NSAIDS for one week prior to surgery per anesthesia guidelines  Tylenol is ok to take as needed  You will receive a call one business day prior to surgery with an arrival time and hospital directions  If your surgery is scheduled on a Monday, the hospital will be calling you on the Friday prior to your surgery  If you have not heard from anyone by 8pm, please call the hospital supervisor through the hospital  at [phone]  Selvin Ballard [phone])  Do not eat or drink anything after midnight the night before your surgery, including candy, mints, lifesavers, or chewing gum  Do not drink alcohol 24hrs before your surgery  Try not to smoke at least 24hrs before your surgery  Follow the pre surgery showering instructions as listed in the [facility] Surgical Experience Booklet” or otherwise provided by your surgeon's office  Do not shave the surgical area 24 hours before surgery  Do not apply any lotions, creams, including makeup, cologne, deodorant, or perfumes after showering on the day of your surgery  No contact lenses, eye make-up, or artificial eyelashes  Remove nail polish, including gel polish, and any artificial, gel, or acrylic nails if possible  Remove all jewelry including rings and body piercing jewelry  Wear causal clothing that is easy to take on and off  Consider your type of surgery  Keep any valuables, jewelry, piercings at home  Please bring any specially ordered equipment (sling, braces) if indicated      Arrange for a responsible person to drive you to and from the hospital on the day of your surgery  Visitor Guidelines discussed  Call the surgeon's office with any new illnesses, exposures, or additional questions prior to surgery  Please reference your [facility] Surgical Experience Booklet” for additional information to prepare for your upcoming surgery

## 2023-03-20 ENCOUNTER — ANESTHESIA EVENT (OUTPATIENT)
Dept: PERIOP | Facility: AMBULARY SURGERY CENTER | Age: 38
End: 2023-03-20

## 2023-03-21 ENCOUNTER — ANESTHESIA (OUTPATIENT)
Dept: PERIOP | Facility: AMBULARY SURGERY CENTER | Age: 38
End: 2023-03-21

## 2023-03-21 ENCOUNTER — HOSPITAL ENCOUNTER (OUTPATIENT)
Facility: AMBULARY SURGERY CENTER | Age: 38
Setting detail: OUTPATIENT SURGERY
Discharge: HOME | End: 2023-03-21
Attending: SURGERY | Admitting: SURGERY

## 2023-03-21 VITALS
HEIGHT: 60 IN | HEART RATE: 84 BPM | TEMPERATURE: 97.9 F | BODY MASS INDEX: 23.36 KG/M2 | RESPIRATION RATE: 14 BRPM | SYSTOLIC BLOOD PRESSURE: 116 MMHG | WEIGHT: 119 LBS | DIASTOLIC BLOOD PRESSURE: 61 MMHG | OXYGEN SATURATION: 96 %

## 2023-03-21 DIAGNOSIS — Z47.89 AFTERCARE FOLLOWING SURGERY OF THE MUSCULOSKELETAL SYSTEM: ICD-10-CM

## 2023-03-21 DIAGNOSIS — M67.431 GANGLION CYST OF VOLAR ASPECT OF RIGHT WRIST: Primary | ICD-10-CM

## 2023-03-21 LAB
EXT PREGNANCY TEST URINE: NEGATIVE
EXT. CONTROL: NORMAL

## 2023-03-21 RX ORDER — ONDANSETRON 2 MG/ML
4 INJECTION INTRAMUSCULAR; INTRAVENOUS ONCE AS NEEDED
Status: DISCONTINUED | OUTPATIENT
Start: 2023-03-21 | End: 2023-03-21 | Stop reason: HOSPADM

## 2023-03-21 RX ORDER — CEFAZOLIN SODIUM 2 G/50ML
2000 SOLUTION INTRAVENOUS ONCE
Status: DISCONTINUED | OUTPATIENT
Start: 2023-03-21 | End: 2023-03-21 | Stop reason: HOSPADM

## 2023-03-21 RX ORDER — CHLORHEXIDINE GLUCONATE 0.12 MG/ML
15 RINSE ORAL ONCE
Status: DISCONTINUED | OUTPATIENT
Start: 2023-03-21 | End: 2023-03-21 | Stop reason: HOSPADM

## 2023-03-21 RX ORDER — MAGNESIUM HYDROXIDE 1200 MG/15ML
LIQUID ORAL AS NEEDED
Status: DISCONTINUED | OUTPATIENT
Start: 2023-03-21 | End: 2023-03-21 | Stop reason: HOSPADM

## 2023-03-21 RX ORDER — CEFAZOLIN SODIUM 1 G/3ML
INJECTION, POWDER, FOR SOLUTION INTRAMUSCULAR; INTRAVENOUS AS NEEDED
Status: DISCONTINUED | OUTPATIENT
Start: 2023-03-21 | End: 2023-03-21

## 2023-03-21 RX ORDER — ROPIVACAINE HYDROCHLORIDE 5 MG/ML
INJECTION, SOLUTION EPIDURAL; INFILTRATION; PERINEURAL
Status: COMPLETED | OUTPATIENT
Start: 2023-03-21 | End: 2023-03-21

## 2023-03-21 RX ORDER — PROPOFOL 10 MG/ML
INJECTION, EMULSION INTRAVENOUS CONTINUOUS PRN
Status: DISCONTINUED | OUTPATIENT
Start: 2023-03-21 | End: 2023-03-21

## 2023-03-21 RX ORDER — LIDOCAINE HYDROCHLORIDE 10 MG/ML
INJECTION, SOLUTION EPIDURAL; INFILTRATION; INTRACAUDAL; PERINEURAL AS NEEDED
Status: DISCONTINUED | OUTPATIENT
Start: 2023-03-21 | End: 2023-03-21

## 2023-03-21 RX ORDER — MIDAZOLAM HYDROCHLORIDE 2 MG/2ML
INJECTION, SOLUTION INTRAMUSCULAR; INTRAVENOUS
Status: COMPLETED | OUTPATIENT
Start: 2023-03-21 | End: 2023-03-21

## 2023-03-21 RX ORDER — FENTANYL CITRATE/PF 50 MCG/ML
25 SYRINGE (ML) INJECTION
Status: DISCONTINUED | OUTPATIENT
Start: 2023-03-21 | End: 2023-03-21 | Stop reason: HOSPADM

## 2023-03-21 RX ORDER — PROPOFOL 10 MG/ML
INJECTION, EMULSION INTRAVENOUS AS NEEDED
Status: DISCONTINUED | OUTPATIENT
Start: 2023-03-21 | End: 2023-03-21

## 2023-03-21 RX ORDER — OXYCODONE HYDROCHLORIDE AND ACETAMINOPHEN 5; 325 MG/1; MG/1
1 TABLET ORAL EVERY 4 HOURS PRN
Qty: 12 TABLET | Refills: 0 | Status: SHIPPED | OUTPATIENT
Start: 2023-03-21 | End: 2023-03-31

## 2023-03-21 RX ORDER — FENTANYL CITRATE 50 UG/ML
INJECTION, SOLUTION INTRAMUSCULAR; INTRAVENOUS AS NEEDED
Status: DISCONTINUED | OUTPATIENT
Start: 2023-03-21 | End: 2023-03-21

## 2023-03-21 RX ORDER — ONDANSETRON 2 MG/ML
INJECTION INTRAMUSCULAR; INTRAVENOUS AS NEEDED
Status: DISCONTINUED | OUTPATIENT
Start: 2023-03-21 | End: 2023-03-21

## 2023-03-21 RX ORDER — OXYCODONE HYDROCHLORIDE AND ACETAMINOPHEN 5; 325 MG/1; MG/1
1 TABLET ORAL EVERY 4 HOURS PRN
Status: DISCONTINUED | OUTPATIENT
Start: 2023-03-21 | End: 2023-03-21 | Stop reason: HOSPADM

## 2023-03-21 RX ORDER — SODIUM CHLORIDE, SODIUM LACTATE, POTASSIUM CHLORIDE, CALCIUM CHLORIDE 600; 310; 30; 20 MG/100ML; MG/100ML; MG/100ML; MG/100ML
INJECTION, SOLUTION INTRAVENOUS CONTINUOUS PRN
Status: DISCONTINUED | OUTPATIENT
Start: 2023-03-21 | End: 2023-03-21

## 2023-03-21 RX ADMIN — FENTANYL CITRATE 25 MCG: 50 INJECTION, SOLUTION INTRAMUSCULAR; INTRAVENOUS at 09:26

## 2023-03-21 RX ADMIN — PROPOFOL 30 MG: 10 INJECTION, EMULSION INTRAVENOUS at 09:22

## 2023-03-21 RX ADMIN — ROPIVACAINE HYDROCHLORIDE 25 ML: 5 INJECTION, SOLUTION EPIDURAL; INFILTRATION; PERINEURAL at 08:50

## 2023-03-21 RX ADMIN — LIDOCAINE HYDROCHLORIDE 50 MG: 10 INJECTION, SOLUTION EPIDURAL; INFILTRATION; INTRACAUDAL; PERINEURAL at 09:22

## 2023-03-21 RX ADMIN — MIDAZOLAM HYDROCHLORIDE 2 MG: 1 INJECTION, SOLUTION INTRAMUSCULAR; INTRAVENOUS at 08:47

## 2023-03-21 RX ADMIN — PROPOFOL 30 MG: 10 INJECTION, EMULSION INTRAVENOUS at 09:23

## 2023-03-21 RX ADMIN — ONDANSETRON 4 MG: 2 INJECTION INTRAMUSCULAR; INTRAVENOUS at 09:22

## 2023-03-21 RX ADMIN — SODIUM CHLORIDE, SODIUM LACTATE, POTASSIUM CHLORIDE, AND CALCIUM CHLORIDE: .6; .31; .03; .02 INJECTION, SOLUTION INTRAVENOUS at 08:27

## 2023-03-21 RX ADMIN — PROPOFOL 100 MCG/KG/MIN: 10 INJECTION, EMULSION INTRAVENOUS at 09:22

## 2023-03-21 RX ADMIN — CEFAZOLIN 2000 MG: 1 INJECTION, POWDER, FOR SOLUTION INTRAMUSCULAR; INTRAVENOUS at 09:22

## 2023-03-21 NOTE — DISCHARGE INSTR - AVS FIRST PAGE
Post Operative Instructions    You have had surgery on your arm today, please read and follow the information below:  Elevate your hand above your elbow during the next 24-48 hours to help with swelling  Place your hand and arm over your head with motion at your shoulder three times a day  Do not apply any cream/ointment/oil to your incisions including antibiotics  Do not soak your hands in standing water (dishwater, tubs, Jacuzzi's, pools, etc ) until given permission (typically 2-3 weeks after injury)    Call the office if you notice any:  Increased numbness or tingling of your hand or fingers that is not relieved with elevation  Increasing pain that is not controlled with medication  Difficulty chewing, breathing, swallowing  Chest pains or shortness of breath  Fever over 101 4 degrees  Bandage: Please keep bandages clean and dry  Remove bandage after 5 days  Once bandages are removed you may wash hands with soap and water  Short showers are okay as well, but please avoid soaking the hand as described above (ie no pools, baths, dirty dish water, hot tubs, ocean/lake water, etc)  Sutures will be removed in the office at your first follow up visit, please do not remove them yourself  Please do NOT put any topical agents on the surgical wound including neosporin, peroxide, tea tree oil, vitamin E, etc  as these can delay wound healing  Motion: Move fingers into a fist 5 times a day, DO NOT move any splinted fingers  Weight bearing status: Avoid heavy lifting (>5 pounds) with the extremity that was operated on until follow up appointment  Normal activities of daily living are OK  Ice: Ice for 10 minutes every hour as needed for swelling x 24 hours  Sling: No sling necessary      Pain medication:   Naproxen 220 mg two times a day (this is over the counter!)  *do not take this medication if you were told by your doctor that you cannot take anti-inflammatories or NSAIDS  Tylenol Extended Release 650 mg every 8 hours (this is over the counter!)   Norco/Hydrocodone one tab every 6 hours ONLY AS NEEDED for severe pain        Follow-up Appointment: 10-14 days with Dr Susan Hollingsworth        Please call the office if you have any questions or concerns regarding your post-operative care

## 2023-03-21 NOTE — ANESTHESIA PREPROCEDURE EVALUATION
Procedure:  EXCISION GANGLION CYST (Right: Finger)    Relevant Problems   No relevant active problems        Physical Exam    Airway    Mallampati score: I  TM Distance: >3 FB  Neck ROM: full     Dental   No notable dental hx     Cardiovascular  Cardiovascular exam normal    Pulmonary  Pulmonary exam normal     Other Findings        Anesthesia Plan  ASA Score- 1     Anesthesia Type- regional with ASA Monitors  Additional Monitors:   Airway Plan:           Plan Factors-Exercise tolerance (METS): >4 METS  Chart reviewed  EKG reviewed  Existing labs reviewed  Patient summary reviewed  Patient is not a current smoker  Induction- intravenous  Postoperative Plan-     Informed Consent- Anesthetic plan and risks discussed with patient  I personally reviewed this patient with the CRNA  Discussed and agreed on the Anesthesia Plan with the CRNA  Shankar Ivan

## 2023-03-21 NOTE — OP NOTE
OPERATIVE REPORT  PATIENT NAME: Prasanna Pete  :  1985  MRN: [id number]  Pt Location[facility]    SURGERY DATE: 23    Surgeon(s) and Role:     * David Schwab MD - Primary     * Tu Gray PA-C - Assisting    Pre-Op Diagnosis:  Ganglion cyst of volar aspect of right wrist [M67 431]    Post-Op Diagnosis Codes:     * Ganglion cyst of volar aspect of right wrist [M67 431], recurrent     Procedure(s):  EXCISION GANGLION CYST (Right), recurrent     Specimen(s):  Order Name Source Comment Collection Info Order Time   TISSUE EXAM Soft Tissue, Other  Collected By: David cShwab MD 3/21/2023  9:38 AM     Release to patient through Mychart   Immediate            Estimated Blood Loss:   Minimal    Anesthesia Type:   Regional with Sedation    IMPLANTS:  * No implants in log *    PERIOPERATIVE ANTIBIOTICS:    cefazolin, 1 gram    Tourniquet Time: 8 min  at 250  mmHg          Operative Indications: The patient has a history of right recurrent volar ganglion cyst that was recalcitrant to conservative management  The decision was made to bring the patient to the operating room for right volar recurrent ganglion cyst excision risks of the procedure were explained which include, but are not limited to bleeding; infection; damage to nerves, arteries,veins, tendons; scar; pain; need for reoperation; failure to give desired result; and risks of anaesthesia  All questions were answered to satisfaction and they were willing to proceed  Operative Findings:  Volar recurrent ganglion cyst originating from the volar capsule of the radial carpal joint between the FCR tendon and the radial artery    Complications:   None    Procedure and Technique:  After the patient, site, and procedure were identified, the patient was brought into the operating room in a supine position  Regional anaesthesia and sedation were provided  A well padded tourniquet was applied to the extremity, set at 250 mmHg    The right upper extremity was then prepped and drapped in a normal, sterile, orthopedic fashion  A longitudinal incision was made over the radial aspect of the volar wrist, overlying the volar wrist mass and in line with previous incision  The incision was made just proximal to the distal wrist crease and extending proximally, centered over the volar wrist mass  Blunt dissection was taken down to the level of the cyst  The cyst was identified and was freed from surrounding tissue  The cyst was simple, and was intimately adherent to the radial vasculature  The radial artery was dissected free from the cyst where possible and where it could not, a portion of the cyst wall was left attached to the artery  The stalk was identified and was traced to its origin from the volar capsule  The stalk was transected along with a small window of the capsule  The cyst was passed off the table  At the completion of the procedure, hemostasis was obtained with cautery and direct pressure  The wounds were copiously irrigated with sterile solution  The wounds were closed with Monocryl and Steri-strips  Sterile dressings were applied, including Xeroform, gauze, tweeners, webril, ACE and Volar Splint  Please note, all sponge, needle, and instrument counts were correct prior to closure  Loupe magnification was utilized  The patient tolerated the procedure well       I was present for the entire procedure, A qualified resident physician was not available and A physician assistant was required during the procedure for retraction tissue handling,dissection and suturing    Patient Disposition:  PACU     SIGNATURE: Zander Wright MD  DATE: 03/21/23  TIME: 9:43 AM

## 2023-03-21 NOTE — ANESTHESIA POSTPROCEDURE EVALUATION
Post-Op Assessment Note    CV Status:  Stable  Pain Score: 0    Pain management: adequate     Mental Status:  Alert and awake   Hydration Status:  Euvolemic   PONV Controlled:  Controlled   Airway Patency:  Patent      Post Op Vitals Reviewed: Yes      Staff: CRNA         No notable events documented      BP   100/66   Temp      Pulse  68   Resp   13   SpO2   98%

## 2023-03-21 NOTE — H&P
ASSESSMENT/PLAN:       39 yo female with recurrent right volar ganglion cyst      Xrays were reviewed which demonstrate no osseous abnormalities  Patient would ultimately like the mass removed  We discussed that since this mass has recurred once already there is about a 25% chance it may recur again  I would like to order an ultrasound evaluation of the mass prior to surgery to get a better idea of its location in relation to the radial artery  Risks benefits alternatives were discussed and informed consent was signed today for right volar radial ganglion cyst removal   Postoperative protocol and expectations were discussed and all questions answered to the best of our ability  We will see her back 10-14 days following surgery for postop evaluation and suture removal     The patient verbalized understanding of exam findings and treatment plan  We engaged in the shared decision-making process and treatment options were discussed at length with the patient  Surgical and conservative management discussed today along with risks and benefits      Diagnoses and all orders for this visit:     Ganglion cyst of volar aspect of right wrist  -     Ambulatory Referral to Hand Surgery  -     XR wrist 3+ vw right; Future  -     US extremity soft tissue; Future  -     Case request operating room: EXCISION GANGLION CYST; Standing  -     Case request operating room: EXCISION GANGLION CYST     Other orders  -     Nursing Communication [facility] Interventions Implemented; Standing  -     Nursing Communication CHG bath, have staff wash entire body (neck down) per pre-op bathing protocol  Routine, evening prior to, and day of surgery ; Standing  -     Nursing Communication Swab both nares with Povidone-Iodine solution, EXCLUDE if patient has shellfish/Iodine allergy   Routine, day of surgery, on call to OR; Standing  -     chlorhexidine (PERIDEX) 0 12 % oral rinse 15 mL  -     Void on call to OR; Standing  -     Insert peripheral IV; Standing  -     Diet NPO; Sips with meds; Standing  -     ceFAZolin (ANCEF) 2,000 mg in dextrose 5 % 100 mL IVPB           Follow Up:  Return for After Surgery      To Do Next Visit:  Re-evaluation of current issue and Sutures out        General Discussions:  Ganglion Cysts: The anatomy and physiology of the ganglion was discussed with the patient today in the office  Fluid leaking out of the joint surface typically creates a small sac, which can enlarge and cause pain or limitation of motion  Treatment options include observation, aspiration, or surgical incision were discussed with the patient today  Observation typically lead to resolution and approximately 10% of patients, aspiration results in resolution of approximately 50% of patients, and surgical excision leads to resolution in approximately 97% of patients  After discussion with the patient today, the patient voiced understanding of all treatment options      Operative Discussions:  Ganglion Cyst Excision: The anatomy and physiology of the ganglion was discussed with the patient today in the office  Fluid leaking out of the joint surface typically creates a small sac, which can enlarge and cause pain or limitation of motion  Treatment options include observation, aspiration, or surgical incision were discussed with the patient today  Observation typically lead to resolution and approximately 10% of patients, aspiration least resolution approximately 50% of patients, and surgical excision lead to resolution in approximately 97% of patients  After discussion with the patient today, the patient voiced understanding of all treatment options  The patient has elected excision of the ganglion  The risks and benefits of the procedure were explained to the patient, which include, but are not limited to: Bleeding, infection, recurrence, pain, scar, damage to tendons, damage to nerves, and damage to blood vessels, failure to give desired results and complications related to anesthesia  These risks, along with alternative conservative treatment options, and postoperative protocols were voiced back and understood by the patient  All questions were answered to the patient's satisfaction  The patient agrees to comply with a standard postoperative protocol, and is willing to proceed  Education was provided via written and auditory forms  There were no barriers to learning  Written handouts regarding wound care, incision and scar care, and general preoperative information was provided to the patient  Prior to surgery, the patient may be requested to stop all anti-inflammatory medications  Prophylactic aspirin, Plavix, and Coumadin may be allowed to be continued  Medications including vitamin E , ginkgo, and fish oil are requested to be stopped approximately one week prior to surgery  Hypertensive medications and beta blockers, if taken, should be continued         ____________________________________________________________________________________________________________________________________________        CHIEF COMPLAINT:      Chief Complaint   Patient presents with   • Right Wrist - Pain         SUBJECTIVE:  Imtiaz Medina is a 40y o  year old RHD female seen in consultation requested by Dr Marcus Stark who presents for evaluation of a right volar wrist mass  She has history of the same mass in 2007 which was successfully removed surgically  Mass then recurred recently back in November   It is locally bothersome       Pain/symptom timing:  Worse during the day when active  Pain/symptom context:  Worse with activites and work  Pain/symptom modifying factors:  Rest makes better, activities make worse  Pain/symptom associated signs/symptoms: none     Prior treatment   • NSAIDsNo   • Injections No   • Bracing/Orthotics No    Physical Therapy No      I have personally reviewed all the relevant PMH, PSH, SH, FH, Medications and allergies        PAST MEDICAL HISTORY:       Past Medical History:   Diagnosis Date   • GERD (gastroesophageal reflux disease)       diet controlled   • Migraine           PAST SURGICAL HISTORY:        Past Surgical History:   Procedure Laterality Date   •  SECTION         x3   • CYST REMOVAL         Pilonidal   • CYST REMOVAL         ganglion   • NE EGD TRANSORAL BIOPSY SINGLE/MULTIPLE N/A 2017     Procedure: ESOPHAGOGASTRODUODENOSCOPY (EGD); Surgeon: Mamie Roberson DO;  Location: [facility]; Service: Gastroenterology   • NE LAP,RMV  ADNEXAL STRUCTURE Bilateral 2018     Procedure: LAPAROSCOPIC SALPINGECTOMY;  Surgeon: Jose Elias Collier MD;  Location: [facility];  Service: Gynecology   • WISDOM TOOTH EXTRACTION             FAMILY HISTORY:        Family History   Problem Relation Age of Onset   • No Known Problems Mother     • No Known Problems Father           SOCIAL HISTORY:  Social History            Tobacco Use   • Smoking status: Never   • Smokeless tobacco: Never   Vaping Use   • Vaping Use: Never used   Substance Use Topics   • Alcohol use: Not Currently       Comment: rarely   • Drug use: No         MEDICATIONS:     Current Outpatient Medications:   •  multivitamin (THERAGRAN) TABS, Take 1 tablet by mouth daily, Disp: , Rfl:   •  norgestimate-ethinyl estradiol (Ortho Tri-Cyclen Lo) 0 18/0 215/0 25 MG-25 MCG per tablet, Take 1 tablet by mouth daily, Disp: 84 tablet, Rfl: 3     ALLERGIES:        Allergies   Allergen Reactions   • Pollen Extract Other (See Comments)       Seasonal allergies              REVIEW OF SYSTEMS:  Review of Systems   Constitutional: Negative for chills, fatigue and fever  HENT: Negative for hearing loss, nosebleeds and sore throat  Eyes: Negative for redness and visual disturbance  Respiratory: Negative for cough, shortness of breath and wheezing  Cardiovascular: Negative for chest pain, palpitations and leg swelling     Gastrointestinal: Negative for abdominal pain, nausea and vomiting  Endocrine: Negative for polydipsia and polyuria  Genitourinary: Negative for difficulty urinating, dysuria and hematuria  Musculoskeletal: Positive for arthralgias  Negative for joint swelling and myalgias  Skin: Negative for rash and wound  Neurological: Negative for speech difficulty, weakness, numbness and headaches  Psychiatric/Behavioral: Negative for decreased concentration and suicidal ideas  The patient is not nervous/anxious           VITALS:      Vitals:     12/01/22 0941   BP: 108/65   Pulse: 91   Resp: 16         LABS:  HgA1c: No results found for: HGBA1C  BMP:         Lab Results   Component Value Date     GLUCOSE 83 11/06/2015     CALCIUM 9 2 04/12/2022      11/06/2015     K 4 1 04/12/2022     CO2 28 04/12/2022      04/12/2022     BUN 11 04/12/2022     CREATININE 0 79 04/12/2022         _____________________________________________________  PHYSICAL EXAMINATION:  General: well developed and well nourished, alert, oriented times 3 and appears comfortable  Psychiatric: Normal  HEENT: Normocephalic, Atraumatic Trachea Midline, No torticollis  Pulmonary: No audible wheezing or respiratory distress   Abdomen/GI: Non tender, non distended   Cardiovascular: No pitting edema, 2+ radial pulse   Skin: No masses, erythema, lacerations, fluctation, ulcerations  Neurovascular: Sensation Intact to the Median, Ulnar, Radial Nerve, Motor Intact to the Median, Ulnar, Radial Nerve and Pulses Intact  Musculoskeletal: Normal, except as noted in detailed exam and in HPI         MUSCULOSKELETAL EXAMINATION:  Right wrist  Volar radial wrist mass, about pea sized  Range of motion of the right wrist  is 80 degrees flexion, 80 degrees extension, 90 degrees pronation,90 degrees supination  There is no swelling present  There is no ecchymosis noted      Pulses are present and capillary fill is normal       No overlying skin changes  ___________________________________________________  STUDIES REVIEWED:  I have personally reviewed AP lateral and oblique radiographs of right wrist which demonstrate no acute osseous abnormality   No degenerative changes               PROCEDURES PERFORMED:  Procedures  No Procedures performed today

## 2023-03-21 NOTE — ANESTHESIA PROCEDURE NOTES
Peripheral Block    Patient location during procedure: pre-op  Start time: 3/21/2023 8:50 AM  Reason for block: at surgeon's request and post-op pain management  Staffing  Performed: Anesthesiologist   Anesthesiologist: Yessenia Vargas MD  Preanesthetic Checklist  Completed: patient identified, IV checked, site marked, risks and benefits discussed, surgical consent, monitors and equipment checked, pre-op evaluation and timeout performed  Peripheral Block  Patient position: supine  Prep: ChloraPrep  Patient monitoring: heart rate, continuous pulse ox and frequent blood pressure checks  Block type: supraclavicular  Laterality: right  Injection technique: single-shot  Procedures: ultrasound guided, Ultrasound guidance required for the procedure to increase accuracy and safety of medication placement and decrease risk of complications    Ultrasound permanent image savedropivacaine (NAROPIN) 0 5 % - Perineural   25 mL - 3/21/2023 8:50:00 AM  midazolam (VERSED) 2 mg/2 mL - Intravenous   2 mg - 3/21/2023 8:47:00 AM  Needle  Needle type: Stimuplex   Needle gauge: 22 G  Needle length: 2 in  Needle localization: ultrasound guidance  Assessment  Injection assessment: incremental injection, local visualized surrounding nerve on ultrasound, negative aspiration for heme and no paresthesia on injection  Paresthesia pain: none  Heart rate change: no  Slow fractionated injection: yes  Post-procedure:  site cleaned  patient tolerated the procedure well with no immediate complications

## 2023-04-05 ENCOUNTER — OFFICE VISIT (OUTPATIENT)
Dept: OBGYN CLINIC | Facility: CLINIC | Age: 38
End: 2023-04-05

## 2023-04-05 VITALS
SYSTOLIC BLOOD PRESSURE: 114 MMHG | BODY MASS INDEX: 23.37 KG/M2 | WEIGHT: 119.05 LBS | HEIGHT: 60 IN | DIASTOLIC BLOOD PRESSURE: 77 MMHG | HEART RATE: 75 BPM

## 2023-04-05 DIAGNOSIS — M67.431 GANGLION CYST OF VOLAR ASPECT OF RIGHT WRIST: Primary | ICD-10-CM

## 2023-04-05 NOTE — PROGRESS NOTES
Assessment/Plan:  Patient ID: Estevan Cr 45 y o  female   Surgery: Excision Ganglion Cyst - Right  Date of Surgery: 3/21/2023    Wound is well healed  She was advised to begin aggressive scar massage  Activity as tolerated  Advised this cyst may return but we hope not  Follow up PRN     Follow Up:  PRN    To Do Next Visit:         CHIEF COMPLAINT:  Chief Complaint   Patient presents with   • Right Wrist - Post-op         SUBJECTIVE:  Estevan Cr is a 45y o  year old female who presents for follow up after Excision Ganglion Cyst - Right  Today patient has minimal pain and notes a very uncomplicated postop course  She denies paresthesias or additional complaints         PHYSICAL EXAMINATION:  General: well developed and well nourished, alert, oriented times 3 and appears comfortable  Psychiatric: Normal    MUSCULOSKELETAL EXAMINATION:  Incision: Clean, dry, intact  Surgery Site: normal, no evidence of infection   Range of Motion: opposition intact, full composite fist possible and full wrist ROM  Neurovascular status: Neuro intact, good cap refill  Activity Restrictions: No restrictions         STUDIES REVIEWED:  No Studies to review      PROCEDURES PERFORMED:  Procedures  No Procedures performed today      Ifeoma Maradiaga

## 2024-01-29 DIAGNOSIS — Z30.011 OCP (ORAL CONTRACEPTIVE PILLS) INITIATION: ICD-10-CM

## 2024-01-30 RX ORDER — NORGESTIMATE AND ETHINYL ESTRADIOL
KIT
Qty: 84 TABLET | Refills: 3 | Status: SHIPPED | OUTPATIENT
Start: 2024-01-30

## 2024-01-31 NOTE — TELEPHONE ENCOUNTER
LVM for patient to call to establish with a new provider   Medicine has been refilled but patient needs to be seen    Patient can be reached at [phone]

## 2024-04-03 DIAGNOSIS — Z00.6 ENCOUNTER FOR EXAMINATION FOR NORMAL COMPARISON OR CONTROL IN CLINICAL RESEARCH PROGRAM: ICD-10-CM

## (undated) DEVICE — CURITY STRETCH BANDAGE: Brand: CURITY

## (undated) DEVICE — TRAY FOLEY 16FR URIMETER SURESTEP

## (undated) DEVICE — GLOVE INDICATOR PI UNDERGLOVE SZ 7 BLUE

## (undated) DEVICE — PADDING CAST 4 IN  COTTON STRL

## (undated) DEVICE — ADHESIVE SKN CLSR HISTOACRYL FLEX 0.5ML LF

## (undated) DEVICE — SYRINGE 10ML LL

## (undated) DEVICE — UNIVERSAL GYN LAPAROSCOPY,KIT: Brand: CARDINAL HEALTH

## (undated) DEVICE — INTENDED FOR TISSUE SEPARATION, AND OTHER PROCEDURES THAT REQUIRE A SHARP SURGICAL BLADE TO PUNCTURE OR CUT.: Brand: BARD-PARKER ® CARBON RIB-BACK BLADES

## (undated) DEVICE — GLOVE SRG BIOGEL 7

## (undated) DEVICE — SUT PROLENE 4-0 PS-2 18 IN 8682G

## (undated) DEVICE — ENDOPATH XCEL UNIVERSAL TROCAR STABLILITY SLEEVES: Brand: ENDOPATH XCEL

## (undated) DEVICE — GLOVE SRG BIOGEL ECLIPSE 6.5

## (undated) DEVICE — IRRIG ENDO FLO TUBING

## (undated) DEVICE — GAUZE SPONGES,16 PLY: Brand: CURITY

## (undated) DEVICE — FLOSEAL APPLICATOR TIP ENDOSCOPIC

## (undated) DEVICE — FLOSEAL MATRIX IS INDICATED IN SURGICAL PROCEDURES (OTHER THAN IN OPHTHALMIC) AS AN ADJUNCT TO HEMOSTASIS WHEN CONTROL OF BLEEDING BY LIGATURE OR CONVENTIONAL PROCEDURES IS INEFFECTIVE OR IMPRACTICAL.: Brand: FLOSEAL HEMOSTATIC MATRIX

## (undated) DEVICE — SPONGE PVP SCRUB WING STERILE

## (undated) DEVICE — GLOVE SRG BIOGEL 6.5

## (undated) DEVICE — IMPERVIOUS STOCKINETTE: Brand: DEROYAL

## (undated) DEVICE — PREMIUM DRY TRAY LF: Brand: MEDLINE INDUSTRIES, INC.

## (undated) DEVICE — GLOVE INDICATOR PI UNDERGLOVE SZ 6.5 BLUE

## (undated) DEVICE — CUFF TOURNIQUET 18 X 4 IN QUICK CONNECT DISP 1 BLADDER

## (undated) DEVICE — 3000CC GUARDIAN II: Brand: GUARDIAN

## (undated) DEVICE — STERILE SURGICAL LUBRICANT,  TUBE: Brand: SURGILUBE

## (undated) DEVICE — OCCLUSIVE GAUZE STRIP,3% BISMUTH TRIBROMOPHENATE IN PETROLATUM BLEND: Brand: XEROFORM

## (undated) DEVICE — ACE WRAP 3 IN STERILE

## (undated) DEVICE — ENSEAL LAPAROSCOPIC TISSUE SEALER G2 ARTICULATING  CURVED JAW FOR USE WITH G2 GENERATOR 5MM DIAMETER 35CM SHAFT LENGTH: Brand: ENSEAL

## (undated) DEVICE — SCD SEQUENTIAL COMPRESSION COMFORT SLEEVE MEDIUM KNEE LENGTH: Brand: KENDALL SCD

## (undated) DEVICE — TOWEL SET X-RAY

## (undated) DEVICE — SUT MONOCRYL 4-0 PS-2 27 IN Y426H

## (undated) DEVICE — DISPOSABLE EQUIPMENT COVER: Brand: SMALL TOWEL DRAPE

## (undated) DEVICE — STERILE BETHLEHEM PLASTIC HAND: Brand: CARDINAL HEALTH

## (undated) DEVICE — MINI BLADE ROUND TIP SHARP ON ONE SIDE

## (undated) DEVICE — ENDOPATH XCEL BLADELESS TROCARS WITH STABILITY SLEEVES: Brand: ENDOPATH XCEL

## (undated) DEVICE — HEAVY DUTY TABLE COVER: Brand: CONVERTORS

## (undated) DEVICE — CHLORAPREP HI-LITE 26ML ORANGE

## (undated) DEVICE — 3M™ STERI-STRIP™ REINFORCED ADHESIVE SKIN CLOSURES, R1547, 1/2 IN X 4 IN (12 MM X 100 MM), 6 STRIPS/ENVELOPE: Brand: 3M™ STERI-STRIP™

## (undated) DEVICE — CHLORHEXIDINE 4PCT 4 OZ

## (undated) DEVICE — SYRINGE 50ML LL